# Patient Record
Sex: FEMALE | Race: WHITE | NOT HISPANIC OR LATINO | Employment: STUDENT | URBAN - METROPOLITAN AREA
[De-identification: names, ages, dates, MRNs, and addresses within clinical notes are randomized per-mention and may not be internally consistent; named-entity substitution may affect disease eponyms.]

---
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
## 2021-03-08 ENCOUNTER — TELEPHONE (OUTPATIENT)
Dept: INTERNAL MEDICINE CLINIC | Facility: CLINIC | Age: 21
End: 2021-03-08

## 2021-03-08 NOTE — TELEPHONE ENCOUNTER
The patient's mother saw her daughter's labs on mychart  She is concerned about some of the results and would like to speak to you directly  Please advise   Thank you

## 2021-03-09 DIAGNOSIS — E03.8 SUBCLINICAL HYPOTHYROIDISM: Primary | ICD-10-CM

## 2021-03-18 ENCOUNTER — TELEMEDICINE (OUTPATIENT)
Dept: INTERNAL MEDICINE CLINIC | Facility: CLINIC | Age: 21
End: 2021-03-18
Payer: COMMERCIAL

## 2021-03-18 DIAGNOSIS — R11.2 NON-INTRACTABLE VOMITING WITH NAUSEA, UNSPECIFIED VOMITING TYPE: Primary | ICD-10-CM

## 2021-03-18 PROBLEM — R11.10 NON-INTRACTABLE VOMITING: Status: ACTIVE | Noted: 2021-03-18

## 2021-03-18 PROCEDURE — G2012 BRIEF CHECK IN BY MD/QHP: HCPCS | Performed by: NURSE PRACTITIONER

## 2021-03-18 RX ORDER — ONDANSETRON 4 MG/1
4 TABLET, ORALLY DISINTEGRATING ORAL EVERY 8 HOURS PRN
Qty: 20 TABLET | Refills: 0 | Status: SHIPPED | OUTPATIENT
Start: 2021-03-18

## 2021-03-18 NOTE — PROGRESS NOTES
Virtual Brief Visit    Assessment/Plan:    Problem List Items Addressed This Visit        Digestive    Non-intractable vomiting - Primary       Patient's symptoms appear to be viral gastroenteritis  Recommend lots of fluids water chicken soup and rest   I offered COVID testing but patient declined  Ordered Zofran 4 mg take every 8 hours for nausea and vomiting and instructed patient to call us back if worsening  In regards to insomnia I could not prescribe her Seroquel and she was instructed to call her psychiatrist   Both patient and mother agreed         Relevant Medications    ondansetron (ZOFRAN-ODT) 4 mg disintegrating tablet                Reason for visit is   Chief Complaint   Patient presents with    Virtual Brief Visit        Encounter provider MARIIA Malagon    Provider located at [address] [place] [id number]    Recent Visits  No visits were found meeting these conditions  Showing recent visits within past 7 days and meeting all other requirements     Today's Visits  Date Type Provider Dept   03/18/21 Telemedicine Lindy Malagon today's visits and meeting all other requirements     Future Appointments  No visits were found meeting these conditions  Showing future appointments within next 150 days and meeting all other requirements        After connecting through telephone, the patient was identified by name and date of birth  Jessica Naresh was informed that this is a telemedicine visit and that the visit is being conducted through telephone  My office door was closed  No one else was in the room  She acknowledged consent and understanding of privacy and security of the platform  The patient has agreed to participate and understands she can discontinue the visit at any time  Patient is aware this is a billable service  Meli Arboleda is a 24 y o  female       Patient is here with complaints of nausea and vomiting for 3 days  T-max of 99F  She also has severe chills   denies any diarrhea or abdominal pain   she has absolutely no respiratory symptoms including cough, headache, runny nose, sore throat, no loss of smell or taste  She has trouble sleeping because she is out of her Seroquel and due to the nausea she is unable to sleep  She does have a psychiatrist        Past Medical History:   Diagnosis Date    Hodgkin lymphoma ([facility] [address] )        Past Surgical History:   Procedure Laterality Date    BONE MARROW BIOPSY         Current Outpatient Medications   Medication Sig Dispense Refill    amphetamine-dextroamphetamine (ADDERALL) 15 MG tablet Take by mouth daily      busPIRone (BUSPAR) 15 mg tablet Take 15 mg by mouth 2 (two) times a day      Cholecalciferol (Vitamin D3) 25 MCG (1000 UT) CAPS Take by mouth      cloNIDine (CATAPRES) 0 2 mg tablet Take 1 tablet (0 2 mg total) by mouth daily at bedtime      EX-LAX 15 MG CHEW 1 capsule daily  0    fluticasone (FLONASE) 50 mcg/act nasal spray 1 spray into each nostril 2 (two) times a day 16 g 3    hydrOXYzine pamoate (VISTARIL) 25 mg capsule Take 50 mg by mouth daily at bedtime      Lactobacillus Rhamnosus, GG, (CULTURELLE PO) Take 1 tablet by mouth daily      lactulose 20 g/30 mL Take 10 g by mouth daily      magnesium citrate (Citroma) 1 745 g/30 mL oral solution Take by mouth      ondansetron (ZOFRAN-ODT) 4 mg disintegrating tablet Take 1 tablet (4 mg total) by mouth every 8 (eight) hours as needed for nausea or vomiting 20 tablet 0    risperiDONE (RisperDAL) 1 mg tablet Take 1 tablet by mouth daily      SODIUM FLUORIDE, DENTAL GEL, (PREVIDENT) 1 1 % GEL Take 1 application by mouth daily      Venlafaxine HCl (EFFEXOR PO) Take 225 mg by mouth daily        No current facility-administered medications for this visit           Allergies   Allergen Reactions    Other Allergic Rhinitis     Seasonal allergies       Review of Systems   Constitutional: Negative  HENT: Negative  Eyes: Negative  Respiratory: Negative  Cardiovascular: Negative  Gastrointestinal: Positive for nausea and vomiting  Musculoskeletal: Negative  Neurological: Negative  There were no vitals filed for this visit  I spent 15 minutes directly with the patient during this visit    VIRTUAL VISIT DISCLAIMER    Ervin Leif acknowledges that she has consented to an online visit or consultation  She understands that the online visit is based solely on information provided by her, and that, in the absence of a face-to-face physical evaluation by the physician, the diagnosis she receives is both limited and provisional in terms of accuracy and completeness  This is not intended to replace a full medical face-to-face evaluation by the physician  Ervin Yadav understands and accepts these terms

## 2021-03-18 NOTE — ASSESSMENT & PLAN NOTE
Patient's symptoms appear to be viral gastroenteritis  Recommend lots of fluids water chicken soup and rest   I offered COVID testing but patient declined  Ordered Zofran 4 mg take every 8 hours for nausea and vomiting and instructed patient to call us back if worsening    In regards to insomnia I could not prescribe her Seroquel and she was instructed to call her psychiatrist   Both patient and mother agreed

## 2021-06-02 ENCOUNTER — OFFICE VISIT (OUTPATIENT)
Dept: INTERNAL MEDICINE CLINIC | Facility: CLINIC | Age: 21
End: 2021-06-02
Payer: COMMERCIAL

## 2021-06-02 VITALS
HEIGHT: 59 IN | OXYGEN SATURATION: 100 % | WEIGHT: 113 LBS | HEART RATE: 125 BPM | DIASTOLIC BLOOD PRESSURE: 80 MMHG | SYSTOLIC BLOOD PRESSURE: 110 MMHG | TEMPERATURE: 98.4 F | BODY MASS INDEX: 22.78 KG/M2

## 2021-06-02 DIAGNOSIS — R06.02 SHORTNESS OF BREATH: ICD-10-CM

## 2021-06-02 DIAGNOSIS — K59.09 CHRONIC CONSTIPATION: ICD-10-CM

## 2021-06-02 DIAGNOSIS — E55.9 VITAMIN D INSUFFICIENCY: ICD-10-CM

## 2021-06-02 DIAGNOSIS — R63.4 WEIGHT LOSS: ICD-10-CM

## 2021-06-02 DIAGNOSIS — R53.82 CHRONIC FATIGUE: Primary | ICD-10-CM

## 2021-06-02 PROCEDURE — 1036F TOBACCO NON-USER: CPT | Performed by: INTERNAL MEDICINE

## 2021-06-02 PROCEDURE — 3008F BODY MASS INDEX DOCD: CPT | Performed by: INTERNAL MEDICINE

## 2021-06-02 PROCEDURE — 99214 OFFICE O/P EST MOD 30 MIN: CPT | Performed by: INTERNAL MEDICINE

## 2021-06-02 RX ORDER — LACTULOSE 20 G/30ML
10 SOLUTION ORAL DAILY
Qty: 946 ML | Refills: 1 | Status: SHIPPED | OUTPATIENT
Start: 2021-06-02

## 2021-06-02 NOTE — PROGRESS NOTES
Assessment/Plan:  Chronic fatigue and recent SOB, weight loss  Obtain labs ASAP and CXR  Resume daily lactulose for chronic constipation     Problem List Items Addressed This Visit        Digestive    Chronic constipation    Relevant Medications    lactulose 20 g/30 mL    Other Relevant Orders    TSH, 3rd generation with Free T4 reflex       Other    Fatigue - Primary    Relevant Orders    TSH, 3rd generation with Free T4 reflex    CBC and differential    Comprehensive metabolic panel    Vitamin D insufficiency    Relevant Orders    Vitamin D 25 hydroxy      Other Visit Diagnoses     Weight loss        Relevant Orders    TSH, 3rd generation with Free T4 reflex    CBC and differential    Comprehensive metabolic panel    Shortness of breath        Relevant Orders    XR chest pa & lateral            Subjective:      Patient ID: Sapna Ruiz is a 22 y o  female  HPI  Here with her mother  C/o fatigue  SOB with exertion for the past few months  Since she started new OCP a few months ago;she gets a few days a week  Chronic constipation for which she was going to [facility]  Currently using enemas PRN  She was on lactulose, Ex-lax and Culturelle daily  She ran out of lactulose a few months ago  Not seeing GI locally and and last time Miralax was recommended  Poor appetite and weight loss  She has Hodgkins lymphoma in remission    The following portions of the patient's history were reviewed and updated as appropriate: allergies, current medications, past family history, past medical history, past social history, past surgical history and problem list     Review of Systems   Constitutional: Positive for appetite change (less), fatigue and unexpected weight change (weight loss)  Negative for chills and fever  HENT: Negative for congestion  Respiratory: Positive for chest tightness and shortness of breath (with exertion)  Negative for cough and wheezing  Gastrointestinal: Positive for nausea and vomiting  Objective:      /80   Pulse (!) 125   Temp 98 4 °F (36 9 °C) (Temporal)   Ht 4' 11" (1 499 m)   Wt 51 3 kg (113 lb)   SpO2 100%   BMI 22 82 kg/m²          Physical Exam  Constitutional:       General: She is not in acute distress  Appearance: She is well-developed  She is not ill-appearing, toxic-appearing or diaphoretic  HENT:      Head: Normocephalic and atraumatic  Eyes:      Conjunctiva/sclera: Conjunctivae normal    Neck:      Musculoskeletal: Neck supple  Cardiovascular:      Rate and Rhythm: Normal rate and regular rhythm  Heart sounds: Normal heart sounds  No murmur  Pulmonary:      Effort: Pulmonary effort is normal  No respiratory distress  Breath sounds: Normal breath sounds  No wheezing or rales  Abdominal:      General: There is no distension  Palpations: Abdomen is soft  There is no mass  Tenderness: There is no abdominal tenderness  There is no guarding or rebound  Musculoskeletal:      Right lower leg: No edema  Left lower leg: No edema  Skin:     General: Skin is warm and dry  Neurological:      Mental Status: She is alert and oriented to person, place, and time  Psychiatric:         Mood and Affect: Mood is anxious  Behavior: Behavior normal          Thought Content:  Thought content normal          Judgment: Judgment normal

## 2021-06-21 ENCOUNTER — APPOINTMENT (OUTPATIENT)
Dept: RADIOLOGY | Age: 21
End: 2021-06-21
Payer: COMMERCIAL

## 2021-06-21 DIAGNOSIS — R06.02 SHORTNESS OF BREATH: ICD-10-CM

## 2021-06-21 PROCEDURE — 71046 X-RAY EXAM CHEST 2 VIEWS: CPT

## 2021-06-23 ENCOUNTER — OFFICE VISIT (OUTPATIENT)
Dept: INTERNAL MEDICINE CLINIC | Facility: CLINIC | Age: 21
End: 2021-06-23
Payer: COMMERCIAL

## 2021-06-23 VITALS
SYSTOLIC BLOOD PRESSURE: 98 MMHG | HEIGHT: 59 IN | WEIGHT: 114.2 LBS | DIASTOLIC BLOOD PRESSURE: 62 MMHG | HEART RATE: 94 BPM | OXYGEN SATURATION: 100 % | BODY MASS INDEX: 23.02 KG/M2 | TEMPERATURE: 97.7 F

## 2021-06-23 DIAGNOSIS — Z11.59 NEED FOR HEPATITIS C SCREENING TEST: ICD-10-CM

## 2021-06-23 DIAGNOSIS — E55.9 VITAMIN D INSUFFICIENCY: Primary | ICD-10-CM

## 2021-06-23 DIAGNOSIS — E03.8 SUBCLINICAL HYPOTHYROIDISM: ICD-10-CM

## 2021-06-23 DIAGNOSIS — Z11.4 SCREENING FOR HIV (HUMAN IMMUNODEFICIENCY VIRUS): ICD-10-CM

## 2021-06-23 PROCEDURE — 3008F BODY MASS INDEX DOCD: CPT | Performed by: INTERNAL MEDICINE

## 2021-06-23 PROCEDURE — 99214 OFFICE O/P EST MOD 30 MIN: CPT | Performed by: INTERNAL MEDICINE

## 2021-06-23 PROCEDURE — 1036F TOBACCO NON-USER: CPT | Performed by: INTERNAL MEDICINE

## 2021-06-23 RX ORDER — CHOLECALCIFEROL (VITAMIN D3) 125 MCG
2000 CAPSULE ORAL DAILY
Qty: 90 TABLET | Refills: 1 | Status: SHIPPED | OUTPATIENT
Start: 2021-06-23

## 2021-06-23 NOTE — PROGRESS NOTES
Assessment/Plan:  Fatigue and SOB have improved spontaneously  D is a little low so start taking 2000 IU daily  Will monitor TSH    Advised to start using Debrox to the right ear     Problem List Items Addressed This Visit        Other    Vitamin D insufficiency - Primary    Relevant Medications    Cholecalciferol (Vitamin D3) 50 MCG (2000 UT) TABS    Other Relevant Orders    Vitamin D 25 hydroxy      Other Visit Diagnoses     Need for hepatitis C screening test        Relevant Orders    Hepatitis C Antibody ([facility])    Screening for HIV (human immunodeficiency virus)        Relevant Orders    HIV 1/2 Antigen/Antibody (4th Generation) w Reflex [facility]    Subclinical hypothyroidism        Relevant Orders    TSH, 3rd generation with Free T4 reflex            Subjective:      Patient ID: Onesimo Lombardi is a 22 y o  female  HPI  She was seen 3 weeks ago with fatigue weight loss and SOB  Hodgkins lymphoma in remission  CXR was normal  TSH slightly elevated with normal T4; vitamin D is a little low  She has been feeling better in the past 3 weeks  Sometimes gets SOB for example when she was walking outside carrying something but not when she was exercising for an hour in the gym  She does not drink enough water    The following portions of the patient's history were reviewed and updated as appropriate: allergies, current medications, past medical history, past social history, past surgical history and problem list     Review of Systems   Constitutional: Positive for fatigue  Negative for chills and fever  Respiratory: Positive for shortness of breath  Negative for cough  Cardiovascular: Negative for chest pain and palpitations  Gastrointestinal: Positive for constipation (just resumed Lactulose and is on Exlax and Culturelle)  Negative for abdominal pain           Objective:      BP 98/62   Pulse 94   Temp 97 7 °F (36 5 °C)   Ht 4' 11" (1 499 m)   Wt 51 8 kg (114 lb 3 2 oz)   SpO2 100%   BMI 23 07 kg/m²          Physical Exam  Constitutional:       General: She is not in acute distress  Appearance: She is well-developed  She is not ill-appearing, toxic-appearing or diaphoretic  HENT:      Head: Normocephalic and atraumatic  Right Ear: There is impacted cerumen  Left Ear: There is no impacted cerumen  Eyes:      Conjunctiva/sclera: Conjunctivae normal    Cardiovascular:      Rate and Rhythm: Normal rate and regular rhythm  Heart sounds: Normal heart sounds  No murmur heard  Pulmonary:      Effort: Pulmonary effort is normal  No respiratory distress  Breath sounds: Normal breath sounds  No wheezing or rales  Abdominal:      General: There is no distension  Palpations: Abdomen is soft  There is no mass  Tenderness: There is no abdominal tenderness  There is no guarding or rebound  Musculoskeletal:      Cervical back: Neck supple  Skin:     General: Skin is warm and dry  Neurological:      Mental Status: She is alert and oriented to person, place, and time  Psychiatric:         Mood and Affect: Mood normal          Behavior: Behavior normal          Thought Content:  Thought content normal          Judgment: Judgment normal

## 2021-10-08 ENCOUNTER — OFFICE VISIT (OUTPATIENT)
Dept: INTERNAL MEDICINE CLINIC | Facility: CLINIC | Age: 21
End: 2021-10-08
Payer: COMMERCIAL

## 2021-10-08 VITALS
SYSTOLIC BLOOD PRESSURE: 88 MMHG | WEIGHT: 111 LBS | HEIGHT: 59 IN | HEART RATE: 111 BPM | DIASTOLIC BLOOD PRESSURE: 52 MMHG | TEMPERATURE: 96.4 F | BODY MASS INDEX: 22.38 KG/M2 | OXYGEN SATURATION: 100 %

## 2021-10-08 DIAGNOSIS — Z23 ENCOUNTER FOR IMMUNIZATION: ICD-10-CM

## 2021-10-08 DIAGNOSIS — N30.00 ACUTE CYSTITIS WITHOUT HEMATURIA: Primary | ICD-10-CM

## 2021-10-08 LAB
BACTERIA UR QL AUTO: ABNORMAL /HPF
BILIRUB UR QL STRIP: NEGATIVE
CLARITY UR: ABNORMAL
COARSE GRAN CASTS URNS QL MICRO: ABNORMAL /LPF
COLOR UR: YELLOW
GLUCOSE UR STRIP-MCNC: NEGATIVE MG/DL
HGB UR QL STRIP.AUTO: NEGATIVE
KETONES UR STRIP-MCNC: NEGATIVE MG/DL
LEUKOCYTE ESTERASE UR QL STRIP: ABNORMAL
NITRITE UR QL STRIP: NEGATIVE
NON-SQ EPI CELLS URNS QL MICRO: ABNORMAL /HPF
PH UR STRIP.AUTO: 6 [PH]
PROT UR STRIP-MCNC: NEGATIVE MG/DL
RBC #/AREA URNS AUTO: ABNORMAL /HPF
SL AMB  POCT GLUCOSE, UA: ABNORMAL
SL AMB LEUKOCYTE ESTERASE,UA: ABNORMAL
SL AMB POCT BILIRUBIN,UA: ABNORMAL
SL AMB POCT BLOOD,UA: ABNORMAL
SL AMB POCT CLARITY,UA: CLEAR
SL AMB POCT COLOR,UA: ABNORMAL
SL AMB POCT KETONES,UA: ABNORMAL
SL AMB POCT NITRITE,UA: ABNORMAL
SL AMB POCT PH,UA: 6
SL AMB POCT SPECIFIC GRAVITY,UA: 1.03
SL AMB POCT URINE PROTEIN: ABNORMAL
SL AMB POCT UROBILINOGEN: 3.5
SP GR UR STRIP.AUTO: 1.02 (ref 1–1.03)
UROBILINOGEN UR QL STRIP.AUTO: 0.2 E.U./DL
WBC #/AREA URNS AUTO: ABNORMAL /HPF

## 2021-10-08 PROCEDURE — 81002 URINALYSIS NONAUTO W/O SCOPE: CPT | Performed by: NURSE PRACTITIONER

## 2021-10-08 PROCEDURE — 87086 URINE CULTURE/COLONY COUNT: CPT | Performed by: NURSE PRACTITIONER

## 2021-10-08 PROCEDURE — 81001 URINALYSIS AUTO W/SCOPE: CPT | Performed by: NURSE PRACTITIONER

## 2021-10-08 PROCEDURE — 99213 OFFICE O/P EST LOW 20 MIN: CPT | Performed by: NURSE PRACTITIONER

## 2021-10-08 RX ORDER — CIPROFLOXACIN 500 MG/1
500 TABLET, FILM COATED ORAL EVERY 12 HOURS SCHEDULED
Qty: 14 TABLET | Refills: 0 | Status: SHIPPED | OUTPATIENT
Start: 2021-10-08 | End: 2021-10-15

## 2021-10-08 RX ORDER — MEDROXYPROGESTERONE ACETATE 150 MG/ML
INJECTION, SUSPENSION INTRAMUSCULAR
COMMUNITY
Start: 2021-08-13 | End: 2022-02-01 | Stop reason: SDUPTHER

## 2021-10-09 LAB — BACTERIA UR CULT: NORMAL

## 2021-12-16 ENCOUNTER — VBI (OUTPATIENT)
Dept: ADMINISTRATIVE | Facility: OTHER | Age: 21
End: 2021-12-16

## 2022-02-01 ENCOUNTER — OFFICE VISIT (OUTPATIENT)
Dept: INTERNAL MEDICINE CLINIC | Facility: CLINIC | Age: 22
End: 2022-02-01
Payer: COMMERCIAL

## 2022-02-01 VITALS
OXYGEN SATURATION: 99 % | DIASTOLIC BLOOD PRESSURE: 70 MMHG | RESPIRATION RATE: 14 BRPM | HEART RATE: 126 BPM | SYSTOLIC BLOOD PRESSURE: 94 MMHG | WEIGHT: 110.8 LBS | TEMPERATURE: 98.7 F | BODY MASS INDEX: 22.34 KG/M2 | HEIGHT: 59 IN

## 2022-02-01 DIAGNOSIS — C81.19: ICD-10-CM

## 2022-02-01 DIAGNOSIS — E55.9 VITAMIN D INSUFFICIENCY: ICD-10-CM

## 2022-02-01 DIAGNOSIS — E03.8 SUBCLINICAL HYPOTHYROIDISM: ICD-10-CM

## 2022-02-01 DIAGNOSIS — Z00.00 ANNUAL PHYSICAL EXAM: Primary | ICD-10-CM

## 2022-02-01 DIAGNOSIS — R42 DIZZINESS: ICD-10-CM

## 2022-02-01 PROBLEM — R53.83 FATIGUE: Status: RESOLVED | Noted: 2017-04-25 | Resolved: 2022-02-01

## 2022-02-01 PROBLEM — N30.00 ACUTE CYSTITIS WITHOUT HEMATURIA: Status: RESOLVED | Noted: 2021-10-08 | Resolved: 2022-02-01

## 2022-02-01 PROCEDURE — 99395 PREV VISIT EST AGE 18-39: CPT | Performed by: INTERNAL MEDICINE

## 2022-02-01 RX ORDER — LINACLOTIDE 290 UG/1
1 CAPSULE, GELATIN COATED ORAL EVERY MORNING
COMMUNITY
Start: 2022-01-06

## 2022-02-01 NOTE — PATIENT INSTRUCTIONS

## 2022-02-01 NOTE — PROGRESS NOTES
[facility]    NAME: Peg Winchester  AGE: 22 y o  SEX: female  : 2000     DATE: 2022     Assessment and Plan:     Problem List Items Addressed This Visit        Other    Vitamin D insufficiency    Relevant Orders    Vitamin D 25 hydroxy    Hodgkin's disease, nodular sclerosis, extranodal and solid organ sites [facility])     In remission           Other Visit Diagnoses     Annual physical exam    -  Primary    Dizziness        Relevant Orders    CBC and differential    Comprehensive metabolic panel    Subclinical hypothyroidism        Relevant Orders    TSH, 3rd generation with Free T4 reflex          Immunizations and preventive care screenings were discussed with patient today  Appropriate education was printed on patient's after visit summary  Counseling:  · Exercise: the importance of regular exercise/physical activity was discussed  Recommend exercise 3-5 times per week for at least 30 minutes  Call GI re: Naty Church which is causing diarrhea   Stop it for now but discuss going on a lower dose  Discuss clonidine with psychiatry because of orthostasis       Return in about 4 months (around 2022)  Chief Complaint:     Chief Complaint   Patient presents with    Physical Exam      History of Present Illness:     Adult Annual Physical   Patient here for a comprehensive physical exam  The patient reports problems - diarrhea  Chronic constipation and started Linzess 290mcg about a week ago  She was taking Exlax, Lactulose and Culturelle daily  She is still taking these    Dizzy spells, lightheaded when she gets up from bed for months now  Shaking when this happens but no LOC    Diet and Physical Activity  · Diet/Nutrition: limited junk food and consuming 3-5 servings of fruits/vegetables daily  · Exercise: 1-2 times a week on average        Depression Screening  PHQ-2/9 Depression Screening         General Health  · Sleep: difficult to stay asleep  · Hearing: normal - bilateral   · Vision: no vision problems  · Dental: regular dental visits  /GYN Health  · On depo injections x 1 year     Review of Systems:     Review of Systems   Constitutional: Negative for chills, fever and unexpected weight change  Respiratory: Negative for shortness of breath  Cardiovascular: Negative for chest pain and palpitations  Gastrointestinal: Positive for diarrhea  Negative for abdominal pain, nausea and vomiting  Neurological: Positive for dizziness  Psychiatric/Behavioral: Positive for sleep disturbance        Past Medical History:     Past Medical History:   Diagnosis Date    Acute cystitis without hematuria 10/8/2021    Fatigue 4/25/2017    Hodgkin lymphoma [facility])       Past Surgical History:     Past Surgical History:   Procedure Laterality Date    BONE MARROW BIOPSY        Social History:     Social History     Socioeconomic History    Marital status: Single     Spouse name: None    Number of children: None    Years of education: None    Highest education level: None   Occupational History    None   Tobacco Use    Smoking status: Never Smoker    Smokeless tobacco: Never Used   Vaping Use    Vaping Use: Never used   Substance and Sexual Activity    Alcohol use: Never    Drug use: Never    Sexual activity: Never   Other Topics Concern    None   Social History Narrative    None     Social Determinants of Health     Financial Resource Strain: Not on file   Food Insecurity: Not on file   Transportation Needs: Not on file   Physical Activity: Not on file   Stress: Not on file   Social Connections: Not on file   Intimate Partner Violence: Not on file   Housing Stability: Not on file      Family History:     Family History   Problem Relation Age of Onset    No Known Problems Mother     Depression Father     Bipolar disorder Father     Diabetes Maternal Grandmother     Lymphoma Maternal Grandmother  Hypertension Maternal Grandfather     Colon cancer Paternal Grandmother       Current Medications:     Current Outpatient Medications   Medication Sig Dispense Refill    amphetamine-dextroamphetamine (ADDERALL) 15 MG tablet Take by mouth daily 25mg      busPIRone (BUSPAR) 15 mg tablet Take 15 mg by mouth 2 (two) times a day      cloNIDine (CATAPRES) 0 2 mg tablet Take 1 tablet (0 2 mg total) by mouth daily at bedtime      EX-LAX 15 MG CHEW 1 capsule daily  0    hydrOXYzine pamoate (VISTARIL) 25 mg capsule Take 50 mg by mouth daily at bedtime      Lactobacillus Rhamnosus, GG, (Culturelle) CAPS Take 1 capsule by mouth daily 90 capsule 3    lactulose 20 g/30 mL Take 15 mL (10 g total) by mouth daily 946 mL 1    Linzess 290 MCG CAPS Take 1 capsule by mouth every morning      magnesium citrate (Citroma) 1 745 g/30 mL oral solution Take by mouth as needed       medroxyPROGESTERone (DEPO-PROVERA) 150 mg/mL injection use as directed at physician's office every 3 months      QUEtiapine (SEROquel) 200 mg tablet Take 200 mg by mouth daily at bedtime      Venlafaxine HCl (EFFEXOR PO) Take 225 mg by mouth daily       Cholecalciferol (Vitamin D3) 50 MCG (2000 UT) TABS Take 1 tablet (2,000 Units total) by mouth daily (Patient not taking: Reported on 2/1/2022 ) 90 tablet 1    fluticasone (FLONASE) 50 mcg/act nasal spray 1 spray into each nostril 2 (two) times a day (Patient not taking: Reported on 2/1/2022 ) 16 g 3    ondansetron (ZOFRAN-ODT) 4 mg disintegrating tablet Take 1 tablet (4 mg total) by mouth every 8 (eight) hours as needed for nausea or vomiting (Patient not taking: Reported on 2/1/2022 ) 20 tablet 0     No current facility-administered medications for this visit  Allergies:      Allergies   Allergen Reactions    Other Allergic Rhinitis     Seasonal allergies      Physical Exam:     BP 94/70 (Patient Position: Sitting)   Pulse (!) 126   Temp 98 7 °F (37 1 °C)   Resp 14   Ht 4' 11" (1 499 m) Wt 50 3 kg (110 lb 12 8 oz)   SpO2 99%   BMI 22 38 kg/m²     Physical Exam  Constitutional:       General: She is not in acute distress  Appearance: She is well-developed  She is not ill-appearing, toxic-appearing or diaphoretic  HENT:      Head: Normocephalic and atraumatic  Right Ear: External ear normal  There is impacted cerumen  Left Ear: External ear normal  There is no impacted cerumen  Eyes:      Conjunctiva/sclera: Conjunctivae normal    Cardiovascular:      Rate and Rhythm: Normal rate and regular rhythm  Heart sounds: Normal heart sounds  No murmur heard  Pulmonary:      Effort: Pulmonary effort is normal  No respiratory distress  Breath sounds: Normal breath sounds  No stridor  No wheezing or rales  Abdominal:      General: There is no distension  Palpations: Abdomen is soft  There is no mass  Tenderness: There is no abdominal tenderness  There is no guarding or rebound  Musculoskeletal:      Cervical back: Neck supple  Skin:     General: Skin is warm and dry  Neurological:      Mental Status: She is alert and oriented to person, place, and time  Psychiatric:         Mood and Affect: Mood normal          Behavior: Behavior normal          Thought Content:  Thought content normal          Judgment: Judgment normal           Chirag Dugan MD   [facility]

## 2022-03-15 ENCOUNTER — TELEPHONE (OUTPATIENT)
Dept: INTERNAL MEDICINE CLINIC | Facility: CLINIC | Age: 22
End: 2022-03-15

## 2022-03-15 NOTE — TELEPHONE ENCOUNTER
Patients mother called in because we try to reach patient to schedule a sooner appointment than next week per carolynn message and her symptoms she is having  Her mother would not be able to get here until about 4:45pm  I offered and appointment for today with MARIIA later today but she stated she wouldn't be able to get her on time       Please advise

## 2022-03-15 NOTE — TELEPHONE ENCOUNTER
I think this is ok for next week since it has been on and off for >1 week already  Advise them to take her to the ER if it gets worse

## 2022-03-16 ENCOUNTER — OFFICE VISIT (OUTPATIENT)
Dept: URGENT CARE | Age: 22
End: 2022-03-16
Payer: COMMERCIAL

## 2022-03-16 ENCOUNTER — APPOINTMENT (OUTPATIENT)
Dept: RADIOLOGY | Age: 22
End: 2022-03-16
Payer: COMMERCIAL

## 2022-03-16 VITALS
TEMPERATURE: 100.5 F | BODY MASS INDEX: 22.58 KG/M2 | HEIGHT: 59 IN | OXYGEN SATURATION: 100 % | WEIGHT: 112 LBS | HEART RATE: 122 BPM | RESPIRATION RATE: 18 BRPM

## 2022-03-16 DIAGNOSIS — R07.89 CHEST HEAVINESS: ICD-10-CM

## 2022-03-16 DIAGNOSIS — R07.89 CHEST HEAVINESS: Primary | ICD-10-CM

## 2022-03-16 PROCEDURE — 93005 ELECTROCARDIOGRAM TRACING: CPT

## 2022-03-16 PROCEDURE — 71046 X-RAY EXAM CHEST 2 VIEWS: CPT

## 2022-03-16 PROCEDURE — 99213 OFFICE O/P EST LOW 20 MIN: CPT

## 2022-03-16 NOTE — PATIENT INSTRUCTIONS
Chest Pain     EKG revealed sinus tachycardia  Resting HR 120s-130s  Temp 100 5  CXR prelim reading: negative for any acute cardiopulmonary disease  As we are limited with testing/resources in the urgent care setting, would recommend to proceed to ER as also recommended by your PCP yesterday  Hope you feel better  AMBULATORY CARE:   Chest pain can be caused by a range of conditions, from not serious to life-threatening  It may be caused by a heart attack or a blood clot in your lungs  Sometimes chest pain or pressure is caused by poor blood flow to your heart (angina)  Infection, inflammation, or a fracture in the bones or cartilage in your chest can cause pain or discomfort  Chest pain can also be a symptom of a digestive problem, such as acid reflux or a stomach ulcer  An anxiety attack or a strong emotion such as anger can also cause chest pain  It is important to follow up with your healthcare provider to find the cause of your chest pain  Common symptoms you may have with chest pain:     · Fever or sweating    · Nausea or vomiting    · Shortness of breath    · Discomfort or pressure that spreads from your chest to your back, jaw, or arm    · A racing or slow heartbeat    · Feeling weak, tired, or faint    Call your local emergency number (911 in the [address] [place] [address],[address]) or have someone call if:     · You have any of the following signs of a heart attack:      ? Squeezing, pressure, or pain in your chest    ? You may also have any of the following:     § Discomfort or pain in your back, neck, jaw, stomach, or arm    § Shortness of breath    § Nausea or vomiting    § Lightheadedness or a sudden cold sweat      Seek care immediately if:     · You have chest discomfort that gets worse, even with medicine  · You cough or vomit blood  · Your bowel movements are black or bloody  · You cannot stop vomiting, or it hurts to swallow      Call your doctor if:   · You have questions or concerns about your condition or care       Treatment for chest pain  may include medicine to treat your symptoms while your healthcare provider finds the cause of your chest pain  · Medicines  may be given to treat the cause of your chest pain  Examples include pain medicine, anxiety medicine, or medicines to increase blood flow to your heart  · Do not take certain medicines without asking your healthcare provider first   These include NSAIDs, herbal or vitamin supplements, or hormones (estrogen or progestin)  · One or more stents  may need to be placed in your heart if pain was caused by blockage  A stent is a wire mesh tube that helps hold your artery open  Healthy living tips: The following are general healthy guidelines  If the cause of your chest pain is known, your healthcare provider will give you specific guidelines to follow  · Do not smoke  Nicotine and other chemicals in cigarettes and cigars can cause lung and heart damage  Ask your healthcare provider for information if you currently smoke and need help to quit  E-cigarettes or smokeless tobacco still contain nicotine  Talk to your healthcare provider before you use these products  · Choose a variety of healthy foods as often as possible  Include fresh, frozen, or canned fruits and vegetables  Also include low-fat dairy products, fish, chicken (without skin), and lean meats  Your healthcare provider or a dietitian can help you create meal plans  You may need to avoid certain foods or drinks if your pain is caused by a digestion problem  · Lower your sodium (salt) intake  Limit foods that are high in sodium, such as canned foods, salty snacks, and cold cuts  If you add salt when you cook food, do not add more at the table  Choose low-sodium canned foods as much as possible  · Drink plenty of water every day  Water helps your body to control your temperature and blood pressure   Ask your healthcare provider how much water you should drink every day     · Ask about activity  Your healthcare provider will tell you which activities to limit or avoid  Ask when you can drive, return to work, and have sex  Ask about the best exercise plan for you  · Maintain a healthy weight  Ask your healthcare provider what a healthy weight is for you  Ask him or her to help you create a safe weight loss plan if you are overweight  · Ask about vaccines you may need  Get the influenza (flu) vaccine every year as soon as recommended, usually in September or October  You may also need a pneumococcal vaccine to prevent pneumonia  The vaccine is usually given every 5 years, starting at age 72  Your healthcare provider can tell you if should get other vaccines, and when to get them  Follow up with your healthcare provider within 72 hours, or as directed: You may need to return for more tests to find the cause of your chest pain  You may be referred to a specialist, such as a cardiologist or gastroenterologist  Write down your questions so you remember to ask them during your visits  © Copyright [facility] 2022 Information is for End User's use only and may not be sold, redistributed or otherwise used for commercial purposes  All illustrations and images included in CareNotes® are the copyrighted property of [employer]  or [facility] [employer]   The above information is an  only  It is not intended as medical advice for individual conditions or treatments  Talk to your doctor, nurse or pharmacist before following any medical regimen to see if it is safe and effective for you

## 2022-03-16 NOTE — PROGRESS NOTES
[facility]        NAME: Meron Win is a 22 y o  female  : 2000    MRN: [id number]  DATE: 2022  TIME: 3:33 PM    Assessment and Plan   Chest heaviness [R07 89]  1  Chest heaviness  XR chest pa & lateral         Patient Instructions     XR prelim reading: no cardiopulm disease  R curvature of the mid thoracic spine  Appears stable when compared to previous imaging  Resting HR 120s-130s  ST on EKG  T 100 5 tympanic  Recommend proceeding to ER for further evaluation as also recommended by PCP yesterday  Follow up with PCP in 3-5 days  Chief Complaint     Chief Complaint   Patient presents with    Shortness of Breath     chest heaviness like elephant on chest >2wks, denies CP  Slight dizziness w/ occurance         History of Present Illness     22 y o  F presents with complaints of chest heaviness, dizziness and SOB x 2 weeks  Denies true chest pain/discomfort but states it feels like an elephant is sitting on her chest  States it's worse with position change  No hx of MI, PE, DVT  Hx significant for Hodgkin's lymphoma, in remission  Hx ADHD, taking Adderall daily  Uses Vistaril as needed  Denies recent changes in medications  Denies anxiety attack  Denies syncope/LOC  States she is nervous because of her hx of lymphoma with chest mass  Requesting a CXR  Review of Systems   Review of Systems   Constitutional: Negative for chills, fatigue and fever  HENT: Negative for congestion, ear discharge, ear pain, facial swelling, rhinorrhea, sinus pain, sore throat and trouble swallowing  Eyes: Negative for photophobia and visual disturbance  Respiratory: Positive for chest tightness  Negative for cough, shortness of breath and wheezing  Cardiovascular: Negative for chest pain, palpitations and leg swelling  Gastrointestinal: Negative for abdominal pain, diarrhea, nausea and vomiting  Genitourinary: Negative for dysuria and flank pain     Musculoskeletal: Negative for arthralgias, back pain and myalgias  Skin: Negative for pallor  Neurological: Negative for dizziness, seizures, syncope, weakness, numbness and headaches  Psychiatric/Behavioral: Negative for sleep disturbance           Current Medications       Current Outpatient Medications:     amphetamine-dextroamphetamine (ADDERALL) 15 MG tablet, Take by mouth daily 25mg, Disp: , Rfl:     busPIRone (BUSPAR) 15 mg tablet, Take 15 mg by mouth 2 (two) times a day, Disp: , Rfl:     cloNIDine (CATAPRES) 0 2 mg tablet, Take 1 tablet (0 2 mg total) by mouth daily at bedtime, Disp: , Rfl:     EX-LAX 15 MG CHEW, 1 capsule daily, Disp: , Rfl: 0    hydrOXYzine pamoate (VISTARIL) 25 mg capsule, Take 50 mg by mouth daily at bedtime, Disp: , Rfl:     Lactobacillus Rhamnosus, GG, (Culturelle) CAPS, Take 1 capsule by mouth daily, Disp: 90 capsule, Rfl: 3    lactulose 20 g/30 mL, Take 15 mL (10 g total) by mouth daily, Disp: 946 mL, Rfl: 1    magnesium citrate (Citroma) 1 745 g/30 mL oral solution, Take by mouth as needed , Disp: , Rfl:     medroxyPROGESTERone (DEPO-PROVERA) 150 mg/mL injection, use as directed at physician's office every 3 months, Disp: , Rfl:     QUEtiapine (SEROquel) 200 mg tablet, Take 200 mg by mouth daily at bedtime, Disp: , Rfl:     Venlafaxine HCl (EFFEXOR PO), Take 225 mg by mouth daily , Disp: , Rfl:     Cholecalciferol (Vitamin D3) 50 MCG (2000 UT) TABS, Take 1 tablet (2,000 Units total) by mouth daily (Patient not taking: Reported on 2/1/2022 ), Disp: 90 tablet, Rfl: 1    fluticasone (FLONASE) 50 mcg/act nasal spray, 1 spray into each nostril 2 (two) times a day (Patient not taking: Reported on 2/1/2022 ), Disp: 16 g, Rfl: 3    Linzess 290 MCG CAPS, Take 1 capsule by mouth every morning, Disp: , Rfl:     ondansetron (ZOFRAN-ODT) 4 mg disintegrating tablet, Take 1 tablet (4 mg total) by mouth every 8 (eight) hours as needed for nausea or vomiting (Patient not taking: Reported on 2/1/2022 ), Disp: 20 tablet, Rfl: 0    Current Allergies     Allergies as of 03/16/2022 - Reviewed 03/16/2022   Allergen Reaction Noted    Other Allergic Rhinitis 08/12/2015            The following portions of the patient's history were reviewed and updated as appropriate: allergies, current medications, past family history, past medical history, past social history, past surgical history and problem list      Past Medical History:   Diagnosis Date    Acute cystitis without hematuria 10/8/2021    Fatigue 4/25/2017    Hodgkin lymphoma ([facility] [address] )        Past Surgical History:   Procedure Laterality Date    BONE MARROW BIOPSY         Family History   Problem Relation Age of Onset    No Known Problems Mother     Depression Father     Bipolar disorder Father     Diabetes Maternal Grandmother     Lymphoma Maternal Grandmother     Hypertension Maternal Grandfather     Colon cancer Paternal Grandmother          Medications have been verified  Objective   Pulse (!) 125   Temp 99 4 °F (37 4 °C)   Resp 18   Ht 4' 11" (1 499 m)   Wt 50 8 kg (112 lb)   LMP  (LMP Unknown)   SpO2 100%   BMI 22 62 kg/m²   No LMP recorded (lmp unknown)  Physical Exam     Physical Exam  Vitals reviewed  Constitutional:       General: She is not in acute distress  Appearance: Normal appearance  She is not toxic-appearing  HENT:      Head: Normocephalic  Right Ear: Tympanic membrane normal       Left Ear: Tympanic membrane normal       Nose: No congestion or rhinorrhea  Right Sinus: No maxillary sinus tenderness or frontal sinus tenderness  Left Sinus: No maxillary sinus tenderness or frontal sinus tenderness  Mouth/Throat:      Pharynx: No oropharyngeal exudate or posterior oropharyngeal erythema  Tonsils: No tonsillar exudate  Eyes:      General: No visual field deficit  Extraocular Movements: Extraocular movements intact  Pupils: Pupils are equal, round, and reactive to light     Neck: Vascular: No carotid bruit  Comments:   S1, S2; Sinus Tach on EKG  No murmurs; no carotid bruit  No pitting edema  Lungs CTAB, 100% RA  CP non-reproducible  Cardiovascular:      Rate and Rhythm: Normal rate and regular rhythm  Pulses: Normal pulses  Heart sounds: Normal heart sounds  Pulmonary:      Effort: Pulmonary effort is normal  No tachypnea or respiratory distress  Breath sounds: Normal breath sounds and air entry  No decreased breath sounds, wheezing, rhonchi or rales  Abdominal:      General: Bowel sounds are normal       Palpations: Abdomen is soft  Musculoskeletal:         General: Normal range of motion  Cervical back: Normal range of motion  Right lower leg: No edema  Left lower leg: No edema  Lymphadenopathy:      Cervical: No cervical adenopathy  Skin:     General: Skin is warm and dry  Neurological:      General: No focal deficit present  Mental Status: She is alert  GCS: GCS eye subscore is 4  GCS verbal subscore is 5  GCS motor subscore is 6  Cranial Nerves: Cranial nerves are intact  Sensory: Sensation is intact  Motor: Motor function is intact  Coordination: Coordination is intact  Romberg sign negative  Gait: Gait is intact  Deep Tendon Reflexes: Reflexes are normal and symmetric        Comments:   Neurovasc intact  PERRL, EOMI  CN II-XII grossly intact

## 2022-03-17 LAB
ATRIAL RATE: 118 BPM
ATRIAL RATE: 119 BPM
P AXIS: 84 DEGREES
P AXIS: 85 DEGREES
PR INTERVAL: 112 MS
PR INTERVAL: 130 MS
QRS AXIS: 78 DEGREES
QRS AXIS: 81 DEGREES
QRSD INTERVAL: 64 MS
QRSD INTERVAL: 68 MS
QT INTERVAL: 314 MS
QT INTERVAL: 322 MS
QTC INTERVAL: 440 MS
QTC INTERVAL: 452 MS
T WAVE AXIS: 73 DEGREES
T WAVE AXIS: 74 DEGREES
VENTRICULAR RATE: 118 BPM
VENTRICULAR RATE: 119 BPM

## 2022-03-17 PROCEDURE — 93010 ELECTROCARDIOGRAM REPORT: CPT | Performed by: INTERNAL MEDICINE

## 2022-03-22 ENCOUNTER — OFFICE VISIT (OUTPATIENT)
Dept: INTERNAL MEDICINE CLINIC | Facility: CLINIC | Age: 22
End: 2022-03-22
Payer: COMMERCIAL

## 2022-03-22 VITALS
HEIGHT: 59 IN | DIASTOLIC BLOOD PRESSURE: 72 MMHG | BODY MASS INDEX: 23.1 KG/M2 | RESPIRATION RATE: 16 BRPM | WEIGHT: 114.6 LBS | SYSTOLIC BLOOD PRESSURE: 104 MMHG | HEART RATE: 111 BPM | OXYGEN SATURATION: 99 % | TEMPERATURE: 98 F

## 2022-03-22 DIAGNOSIS — Z23 NEED FOR VACCINATION: ICD-10-CM

## 2022-03-22 DIAGNOSIS — R07.9 CHEST PAIN, UNSPECIFIED TYPE: Primary | ICD-10-CM

## 2022-03-22 PROCEDURE — 90682 RIV4 VACC RECOMBINANT DNA IM: CPT

## 2022-03-22 PROCEDURE — 99214 OFFICE O/P EST MOD 30 MIN: CPT | Performed by: INTERNAL MEDICINE

## 2022-03-22 PROCEDURE — 90471 IMMUNIZATION ADMIN: CPT

## 2022-03-22 NOTE — PROGRESS NOTES
Assessment/Plan:  Chest pain over the sternum with SOB that is not related to exertion,  No B symptoms,h/o lymphoma-normal exam, ekg, CXR  Discussed that this is possible musculo-skeletal, anxiety, less likely, cancer recurrence  We discussed monitoring for now and proceeding with a chest CT, possibly echo if it worsens or continues in the next 2 weeks  She is requesting the flu vaccine which was administered today       Problem List Items Addressed This Visit     None      Visit Diagnoses     Chest pain, unspecified type    -  Primary    Need for vaccination        Relevant Orders    influenza vaccine, quadrivalent, recombinant, PF, 0 5 mL, for patients 18 yr+ (FLUBLOK)            Subjective:      Patient ID: Onesimo Lombardi is a 22 y o  female  HPI  Here with her mother  3 weeks ago started with pain in the sternum when she gets up from bed  It has gotten worse that she now has it frequently wo exertion  It happens a few times a day and resolves on its own  She feels SOB when the pain is occurring  She went to the urgent care last week where EKG CXR are normal  No fever chills cough colds fatigue change in appetite or weight swollen glands nose bleeding, gum bleeding, easy bruising  H/o lymphoma    The following portions of the patient's history were reviewed and updated as appropriate: allergies, current medications, past family history, past medical history, past social history, past surgical history and problem list     Review of Systems   Constitutional: Negative for chills, fatigue, fever and unexpected weight change  HENT: Negative for rhinorrhea  Respiratory: Positive for shortness of breath  Negative for cough  Cardiovascular: Positive for chest pain  Negative for palpitations  Gastrointestinal: Positive for constipation (relieved by Sadie Crystal)  Negative for abdominal pain  Hematological: Negative for adenopathy  Does not bruise/bleed easily           Objective:      /72   Pulse (!) 111   Temp 98 °F (36 7 °C)   Resp 16   Ht 4' 11" (1 499 m)   Wt 52 kg (114 lb 9 6 oz)   LMP  (LMP Unknown)   SpO2 99%   BMI 23 15 kg/m²          Physical Exam  Constitutional:       General: She is not in acute distress  Appearance: She is well-developed  She is not ill-appearing, toxic-appearing or diaphoretic  HENT:      Head: Normocephalic and atraumatic  Eyes:      Conjunctiva/sclera: Conjunctivae normal    Cardiovascular:      Rate and Rhythm: Normal rate and regular rhythm  Heart sounds: Normal heart sounds  No murmur heard  Pulmonary:      Effort: Pulmonary effort is normal  No respiratory distress  Breath sounds: Normal breath sounds  No wheezing or rales  Chest:      Chest wall: No tenderness  Abdominal:      General: There is no distension  Palpations: Abdomen is soft  There is no mass  Tenderness: There is generalized abdominal tenderness  There is no guarding or rebound  Musculoskeletal:      Cervical back: Neck supple  Right lower leg: No edema  Left lower leg: No edema  Skin:     General: Skin is warm and dry  Neurological:      Mental Status: She is alert and oriented to person, place, and time  Psychiatric:         Mood and Affect: Mood is anxious           Behavior: Behavior normal

## 2022-06-16 NOTE — ASSESSMENT & PLAN NOTE
BP normotensive   No c/o HA, vision changes or RUQpain Ongoing weekly therapy  Medications managed by [facility]

## 2022-08-11 ENCOUNTER — OFFICE VISIT (OUTPATIENT)
Dept: INTERNAL MEDICINE CLINIC | Facility: CLINIC | Age: 22
End: 2022-08-11
Payer: COMMERCIAL

## 2022-08-11 VITALS
HEIGHT: 59 IN | RESPIRATION RATE: 16 BRPM | WEIGHT: 119.4 LBS | DIASTOLIC BLOOD PRESSURE: 66 MMHG | BODY MASS INDEX: 24.07 KG/M2 | SYSTOLIC BLOOD PRESSURE: 98 MMHG | TEMPERATURE: 97.2 F | HEART RATE: 114 BPM | OXYGEN SATURATION: 99 %

## 2022-08-11 DIAGNOSIS — J06.9 VIRAL UPPER RESPIRATORY TRACT INFECTION: Primary | ICD-10-CM

## 2022-08-11 PROCEDURE — 99213 OFFICE O/P EST LOW 20 MIN: CPT | Performed by: INTERNAL MEDICINE

## 2022-08-11 RX ORDER — VENLAFAXINE HYDROCHLORIDE 225 MG/1
225 TABLET, EXTENDED RELEASE ORAL EVERY MORNING
COMMUNITY
Start: 2022-07-15

## 2022-08-11 RX ORDER — DEXTROAMPHETAMINE SACCHARATE, AMPHETAMINE ASPARTATE MONOHYDRATE, DEXTROAMPHETAMINE SULFATE AND AMPHETAMINE SULFATE 6.25; 6.25; 6.25; 6.25 MG/1; MG/1; MG/1; MG/1
25 CAPSULE, EXTENDED RELEASE ORAL EVERY MORNING
COMMUNITY
Start: 2022-05-15

## 2022-08-11 NOTE — PROGRESS NOTES
Assessment/Plan:  Suspect viral illness, could have been COVID but she tested negative a few days ago  Explained to her that her congestion can linger but as long as she has no other symptoms and she is improving, there is no additional intervention needed       Problem List Items Addressed This Visit    None     Visit Diagnoses     Viral upper respiratory tract infection    -  Primary            Subjective:      Patient ID: Onesimo Lombardi is a 25 y o  female  HPI  Started with a nasal congestion sore throat sneezing runny nose cough body aches 8/3 No fever chills  She was around her mother > a week ago  Her mother tested + soon after and is still + but she tested negative a few days ago  She moved in with her father early in July  She is overall feeling better but still with nasal congestion, colored drainage  Using OTC spray and  APAP  At her last visit, she was c/o chest pain  This resolved on its own    The following portions of the patient's history were reviewed and updated as appropriate: allergies, current medications, past family history, past medical history, past social history, past surgical history and problem list     Review of Systems   Constitutional: Negative for chills and fever  HENT: Positive for congestion  Respiratory: Negative for cough and shortness of breath  Cardiovascular: Negative for chest pain  Gastrointestinal: Negative for abdominal pain  Objective:      BP 98/66   Pulse (!) 114   Temp (!) 97 2 °F (36 2 °C)   Resp 16   Ht 4' 11" (1 499 m)   Wt 54 2 kg (119 lb 6 4 oz)   SpO2 99%   BMI 24 12 kg/m²          Physical Exam  Constitutional:       General: She is not in acute distress  Appearance: She is well-developed  She is not ill-appearing, toxic-appearing or diaphoretic  HENT:      Head: Normocephalic and atraumatic  Right Ear: External ear normal       Left Ear: External ear normal  There is no impacted cerumen        Mouth/Throat: Pharynx: No oropharyngeal exudate or posterior oropharyngeal erythema  Eyes:      Conjunctiva/sclera: Conjunctivae normal    Cardiovascular:      Rate and Rhythm: Normal rate and regular rhythm  Heart sounds: Normal heart sounds  No murmur heard  Pulmonary:      Effort: Pulmonary effort is normal  No respiratory distress  Breath sounds: Normal breath sounds  No wheezing or rales  Abdominal:      General: There is no distension  Palpations: Abdomen is soft  There is no mass  Tenderness: There is no abdominal tenderness  There is no guarding or rebound  Musculoskeletal:      Cervical back: Neck supple  Right lower leg: No edema  Left lower leg: No edema  Skin:     General: Skin is warm and dry  Neurological:      Mental Status: She is alert and oriented to person, place, and time  Psychiatric:         Mood and Affect: Mood normal          Behavior: Behavior normal          Thought Content:  Thought content normal          Judgment: Judgment normal

## 2023-02-13 ENCOUNTER — OFFICE VISIT (OUTPATIENT)
Dept: INTERNAL MEDICINE CLINIC | Facility: CLINIC | Age: 23
End: 2023-02-13

## 2023-02-13 VITALS
DIASTOLIC BLOOD PRESSURE: 78 MMHG | RESPIRATION RATE: 16 BRPM | BODY MASS INDEX: 24.84 KG/M2 | HEART RATE: 116 BPM | HEIGHT: 59 IN | SYSTOLIC BLOOD PRESSURE: 114 MMHG | OXYGEN SATURATION: 99 % | WEIGHT: 123.2 LBS

## 2023-02-13 DIAGNOSIS — C81.19: ICD-10-CM

## 2023-02-13 DIAGNOSIS — K59.09 CHRONIC CONSTIPATION: ICD-10-CM

## 2023-02-13 DIAGNOSIS — F80.0 LISPING: ICD-10-CM

## 2023-02-13 DIAGNOSIS — Z23 NEED FOR VACCINATION: ICD-10-CM

## 2023-02-13 DIAGNOSIS — H61.23 IMPACTED CERUMEN OF BOTH EARS: ICD-10-CM

## 2023-02-13 DIAGNOSIS — E03.8 SUBCLINICAL HYPOTHYROIDISM: ICD-10-CM

## 2023-02-13 DIAGNOSIS — Z00.00 ANNUAL PHYSICAL EXAM: Primary | ICD-10-CM

## 2023-02-13 DIAGNOSIS — F84.5 ASPERGER'S DISORDER: ICD-10-CM

## 2023-02-13 PROBLEM — R11.10 NON-INTRACTABLE VOMITING: Status: RESOLVED | Noted: 2021-03-18 | Resolved: 2023-02-13

## 2023-02-13 RX ORDER — LACTULOSE 20 G/30ML
10 SOLUTION ORAL DAILY
Qty: 946 ML | Refills: 1 | Status: SHIPPED | OUTPATIENT
Start: 2023-02-13

## 2023-02-13 NOTE — PROGRESS NOTES
[facility]    NAME: Kandi Davila  AGE: 25 y o  SEX: female  : 2000     DATE: 2023     Assessment and Plan:     Problem List Items Addressed This Visit        Digestive    Chronic constipation    Relevant Medications    lactulose 20 g/30 mL       Endocrine    Subclinical hypothyroidism    Relevant Orders    CBC and differential    Comprehensive metabolic panel    TSH, 3rd generation with Free T4 reflex       Other    Asperger's disorder    Hodgkin's disease, nodular sclerosis, extranodal and solid organ sites [facility])     In remission        Other Visit Diagnoses     Annual physical exam    -  Primary    Need for vaccination        Relevant Orders    influenza vaccine, quadrivalent, 0 5 mL, preservative-free, for adult and pediatric patients 6 mos+ (AFLURIA, FLUARIX, FLULAVAL, FLUZONE) (Completed)    TDAP VACCINE GREATER THAN OR EQUAL TO 6YO IM (Completed)    Lisping        Relevant Orders    Ambulatory Referral to Speech Therapy    Impacted cerumen of both ears        Relevant Orders    Ambulatory Referral to Otolaryngology          Immunizations and preventive care screenings were discussed with patient today  Appropriate education was printed on patient's after visit summary  Counseling:  Exercise: the importance of regular exercise/physical activity was discussed  Recommend exercise 3-5 times per week for at least 30 minutes  Return in about 1 year (around 2024)  Chief Complaint:     Chief Complaint   Patient presents with   • Annual Exam      History of Present Illness:     Adult Annual Physical   Patient here for a comprehensive physical exam  The patient reports problems - lisp and would like to resume speech therapy  Diet and Physical Activity  Diet/Nutrition: well balanced diet  Exercise: strength training exercises and 1-2 times a week on average        Depression Screening  PHQ-2/9 Depression Screening         General Health  Sleep: sleeps well, gets more than 8 hours of sleep on average and taking seroquel  Hearing: decreased - left  Vision: no vision problems and most recent eye exam <1 year ago  Dental: appt on 2/15  /GYN Health    Contraceptive method: injectable contraception  Review of Systems:     Review of Systems   Constitutional: Negative for chills and fever  Respiratory: Negative for shortness of breath  Cardiovascular: Negative for chest pain and palpitations  Gastrointestinal: Positive for constipation (better on current regimen)  Negative for abdominal pain, diarrhea, nausea and vomiting  Genitourinary: Negative for difficulty urinating  Neurological: Negative for dizziness and headaches  Psychiatric/Behavioral: Negative for dysphoric mood        Past Medical History:     Past Medical History:   Diagnosis Date   • Acute cystitis without hematuria 10/8/2021   • Fatigue 4/25/2017   • [facility]    • Non-intractable vomiting 3/18/2021      Past Surgical History:     Past Surgical History:   Procedure Laterality Date   • BONE MARROW BIOPSY        Social History:     Social History     Socioeconomic History   • Marital status: Single     Spouse name: None   • Number of children: None   • Years of education: None   • Highest education level: None   Occupational History   • None   Tobacco Use   • Smoking status: Never   • Smokeless tobacco: Never   Vaping Use   • Vaping Use: Never used   Substance and Sexual Activity   • Alcohol use: Not Currently     Comment: ocassionally   • Drug use: Never   • Sexual activity: Never   Other Topics Concern   • None   Social History Narrative   • None     Social Determinants of Health     Financial Resource Strain: Not on file   Food Insecurity: Not on file   Transportation Needs: Not on file   Physical Activity: Not on file   Stress: Not on file   Social Connections: Not on file   Intimate Partner Violence: Not on file   Housing Stability: Not on file      Family History:     Family History   Problem Relation Age of Onset   • No Known Problems Mother    • Depression Father    • Bipolar disorder Father    • Diabetes Maternal Grandmother    • Lymphoma Maternal Grandmother    • Hypertension Maternal Grandfather    • Colon cancer Paternal Grandmother       Current Medications:     Current Outpatient Medications   Medication Sig Dispense Refill   • amphetamine-dextroamphetamine (ADDERALL XR) 25 MG 24 hr capsule Take 25 mg by mouth every morning     • busPIRone (BUSPAR) 15 mg tablet Take 15 mg by mouth 2 (two) times a day     • Lactobacillus Rhamnosus, GG, (Culturelle) CAPS Take 1 capsule by mouth daily 90 capsule 3   • lactulose 20 g/30 mL Take 15 mL (10 g total) by mouth daily 946 mL 1   • Linzess 290 MCG CAPS Take 1 capsule by mouth every morning     • medroxyPROGESTERone (DEPO-PROVERA) 150 mg/mL injection use as directed at physician's office every 3 months     • QUEtiapine (SEROquel) 200 mg tablet Take 1 tablet (200 mg total) by mouth daily at bedtime 30 tablet 0   • venlafaxine 225 MG TB24 24 hr tablet Take 225 mg by mouth every morning     • Cholecalciferol (Vitamin D3) 50 MCG (2000 UT) TABS Take 1 tablet (2,000 Units total) by mouth daily (Patient not taking: Reported on 2/13/2023) 90 tablet 1     No current facility-administered medications for this visit  Allergies: Allergies   Allergen Reactions   • Other Allergic Rhinitis     Seasonal allergies      Physical Exam:     /78 (BP Location: Left arm, Patient Position: Sitting, Cuff Size: Standard)   Pulse (!) 116   Resp 16   Ht 4' 11" (1 499 m)   Wt 55 9 kg (123 lb 3 2 oz)   SpO2 99%   BMI 24 88 kg/m²     Physical Exam  Constitutional:       General: She is not in acute distress  Appearance: She is well-developed  She is not ill-appearing, toxic-appearing or diaphoretic  HENT:      Head: Normocephalic and atraumatic        Right Ear: External ear normal  There is impacted cerumen  Left Ear: External ear normal  There is impacted cerumen  Eyes:      Conjunctiva/sclera: Conjunctivae normal    Cardiovascular:      Rate and Rhythm: Normal rate and regular rhythm  Heart sounds: Normal heart sounds  No murmur heard  Pulmonary:      Effort: Pulmonary effort is normal  No respiratory distress  Breath sounds: Normal breath sounds  No stridor  No wheezing or rales  Abdominal:      General: There is no distension  Palpations: Abdomen is soft  There is no mass  Tenderness: There is no abdominal tenderness  There is no guarding or rebound  Musculoskeletal:      Cervical back: Neck supple  Neurological:      Mental Status: She is alert and oriented to person, place, and time  Psychiatric:         Mood and Affect: Mood normal          Behavior: Behavior normal          Thought Content:  Thought content normal          Judgment: Judgment normal           Cheryl Vasquez MD   [facility]

## 2023-02-13 NOTE — PATIENT INSTRUCTIONS

## 2023-03-30 ENCOUNTER — TELEPHONE (OUTPATIENT)
Dept: INTERNAL MEDICINE CLINIC | Facility: CLINIC | Age: 23
End: 2023-03-30

## 2023-03-30 DIAGNOSIS — F84.5 ASPERGER'S DISORDER: Primary | ICD-10-CM

## 2023-03-30 DIAGNOSIS — F80.9 DEVELOPMENTAL SPEECH OR LANGUAGE DISORDER: ICD-10-CM

## 2023-03-30 NOTE — TELEPHONE ENCOUNTER
Chava Willson from [facility] would like the patient's referral for speech therapy to be updated with a current date  Please advise  Thank you      Please fax to [phone]    You can reach lindsay at [phone]

## 2023-05-27 ENCOUNTER — OFFICE VISIT (OUTPATIENT)
Dept: URGENT CARE | Age: 23
End: 2023-05-27

## 2023-05-27 DIAGNOSIS — N30.00 ACUTE CYSTITIS WITHOUT HEMATURIA: Primary | ICD-10-CM

## 2023-05-27 DIAGNOSIS — R35.0 URINARY FREQUENCY: ICD-10-CM

## 2023-05-27 LAB
SL AMB  POCT GLUCOSE, UA: NEGATIVE
SL AMB LEUKOCYTE ESTERASE,UA: ABNORMAL
SL AMB POCT BILIRUBIN,UA: NEGATIVE
SL AMB POCT BLOOD,UA: NEGATIVE
SL AMB POCT CLARITY,UA: ABNORMAL
SL AMB POCT COLOR,UA: YELLOW
SL AMB POCT KETONES,UA: NEGATIVE
SL AMB POCT NITRITE,UA: NEGATIVE
SL AMB POCT PH,UA: 6
SL AMB POCT SPECIFIC GRAVITY,UA: 1.02
SL AMB POCT URINE PROTEIN: NEGATIVE
SL AMB POCT UROBILINOGEN: 0.2

## 2023-05-27 RX ORDER — PHENAZOPYRIDINE HYDROCHLORIDE 200 MG/1
200 TABLET, FILM COATED ORAL
Qty: 10 TABLET | Refills: 0 | Status: SHIPPED | OUTPATIENT
Start: 2023-05-27

## 2023-05-27 RX ORDER — SULFAMETHOXAZOLE AND TRIMETHOPRIM 800; 160 MG/1; MG/1
1 TABLET ORAL EVERY 12 HOURS SCHEDULED
Qty: 10 TABLET | Refills: 0 | Status: SHIPPED | OUTPATIENT
Start: 2023-05-27 | End: 2023-06-01

## 2023-05-27 NOTE — PROGRESS NOTES
[facility]        NAME: Rosalind Cabrera is a 26 y o  female  : 2000    MRN: [id number]  DATE: May 27, 2023  TIME: 12:42 PM    Assessment and Plan   Acute cystitis without hematuria [N30 00]  1  Acute cystitis without hematuria  sulfamethoxazole-trimethoprim (BACTRIM DS) 800-160 mg per tablet    phenazopyridine (PYRIDIUM) 200 mg tablet      2  Urinary frequency  Urine culture    POCT urine dip            Patient Instructions       Follow up with PCP as needed  Increase fluids  Finish antibiotic  Chief Complaint     Chief Complaint   Patient presents with   • Possible UTI     Pt presents with frequent urination and incontinent episodes that started 3 days ago  Denies any pain and reports that urine has been clear  Reports having frequent UTI's due to having IBS and seeing a urologist one to two years ago but has been clear for a few months  History of Present Illness       Urinary Tract Infection   This is a new problem  The problem occurs every urination  The quality of the pain is described as burning  The pain is moderate  There has been no fever  She is sexually active  There is no history of pyelonephritis  Associated symptoms include frequency, hesitancy and urgency  Pertinent negatives include no chills, discharge, flank pain, hematuria, nausea, possible pregnancy, sweats or vomiting  Review of Systems   Review of Systems   Constitutional: Negative for chills  Gastrointestinal: Negative for nausea and vomiting  Genitourinary: Positive for frequency, hesitancy and urgency  Negative for flank pain and hematuria  All other systems reviewed and are negative          Current Medications       Current Outpatient Medications:   •  amphetamine-dextroamphetamine (ADDERALL XR) 25 MG 24 hr capsule, Take 25 mg by mouth every morning, Disp: , Rfl:   •  busPIRone (BUSPAR) 15 mg tablet, Take 15 mg by mouth 2 (two) times a day, Disp: , Rfl:   •  lactulose 20 g/30 mL, Take 15 mL (10 g total) by mouth daily, Disp: 946 mL, Rfl: 1  •  Linzess 290 MCG CAPS, Take 1 capsule by mouth every morning, Disp: , Rfl:   •  medroxyPROGESTERone (DEPO-PROVERA) 150 mg/mL injection, use as directed at physician's office every 3 months, Disp: , Rfl:   •  phenazopyridine (PYRIDIUM) 200 mg tablet, Take 1 tablet (200 mg total) by mouth 3 (three) times a day with meals, Disp: 10 tablet, Rfl: 0  •  QUEtiapine (SEROquel) 200 mg tablet, Take 1 tablet (200 mg total) by mouth daily at bedtime, Disp: 30 tablet, Rfl: 0  •  sulfamethoxazole-trimethoprim (BACTRIM DS) 800-160 mg per tablet, Take 1 tablet by mouth every 12 (twelve) hours for 5 days, Disp: 10 tablet, Rfl: 0  •  venlafaxine 225 MG TB24 24 hr tablet, Take 225 mg by mouth every morning, Disp: , Rfl:   •  Cholecalciferol (Vitamin D3) 50 MCG (2000 UT) TABS, Take 1 tablet (2,000 Units total) by mouth daily (Patient not taking: Reported on 2/13/2023), Disp: 90 tablet, Rfl: 1  •  Lactobacillus Rhamnosus, GG, (Culturelle) CAPS, Take 1 capsule by mouth daily (Patient not taking: Reported on 5/27/2023), Disp: 90 capsule, Rfl: 3    Current Allergies     Allergies as of 05/27/2023 - Reviewed 05/27/2023   Allergen Reaction Noted   • Other Allergic Rhinitis 08/12/2015            The following portions of the patient's history were reviewed and updated as appropriate: allergies, current medications, past family history, past medical history, past social history, past surgical history and problem list      Past Medical History:   Diagnosis Date   • Acute cystitis without hematuria 10/8/2021   • Fatigue 4/25/2017   • Hodg[facility] ([facility] )    • Non-intractable vomiting 3/18/2021       Past Surgical History:   Procedure Laterality Date   • BONE MARROW BIOPSY     • FL VCUG VOIDING URETHROCYSTOGRAM  1/2/2019       Family History   Problem Relation Age of Onset   • No Known Problems Mother    • Depression Father    • Bipolar disorder Father    • Diabetes Maternal Grandmother    • Lymphoma Maternal Grandmother    • Hypertension Maternal Grandfather    • Colon cancer Paternal Grandmother          Medications have been verified  Objective   There were no vitals taken for this visit  No LMP recorded  Physical Exam     Physical Exam  Vitals and nursing note reviewed  Constitutional:       Appearance: Normal appearance  She is normal weight  Cardiovascular:      Rate and Rhythm: Normal rate and regular rhythm  Pulses: Normal pulses  Heart sounds: Normal heart sounds  Pulmonary:      Effort: Pulmonary effort is normal       Breath sounds: Normal breath sounds  Abdominal:      General: Bowel sounds are normal       Palpations: Abdomen is soft  Tenderness: There is abdominal tenderness ( suprapubic)  There is no right CVA tenderness or left CVA tenderness  Neurological:      Mental Status: She is alert

## 2023-05-30 LAB — BACTERIA UR CULT: ABNORMAL

## 2023-06-05 ENCOUNTER — HOSPITAL ENCOUNTER (OUTPATIENT)
Dept: RADIOLOGY | Facility: HOSPITAL | Age: 23
Discharge: HOME | End: 2023-06-05
Payer: COMMERCIAL

## 2023-06-05 DIAGNOSIS — R06.02 SHORTNESS OF BREATH: ICD-10-CM

## 2023-06-05 DIAGNOSIS — R00.0 TACHYCARDIA, UNSPECIFIED: ICD-10-CM

## 2023-06-05 DIAGNOSIS — Z85.118 PERSONAL HISTORY OF MALIGNANT NEOPLASM OF BRONCHUS AND LUNG: ICD-10-CM

## 2023-06-05 PROCEDURE — 71046 X-RAY EXAM CHEST 2 VIEWS: CPT

## 2023-06-13 ENCOUNTER — RA CDI HCC (OUTPATIENT)
Dept: OTHER | Facility: HOSPITAL | Age: 23
End: 2023-06-13

## 2023-06-13 NOTE — PROGRESS NOTES
[facility]  coding opportunities          Chart Reviewed number of suggestions sent to Provider: 2   [phone]  [id number] a      Patients Insurance        Commercial Insurance: [address] [place] [address]

## 2023-06-15 RX ORDER — ALBUTEROL SULFATE 90 UG/1
AEROSOL, METERED RESPIRATORY (INHALATION)
COMMUNITY
Start: 2023-06-03

## 2023-06-15 RX ORDER — PROPAFENONE HYDROCHLORIDE 150 MG/1
TABLET, COATED ORAL
COMMUNITY
Start: 2023-06-03 | End: 2023-06-20

## 2023-06-16 DIAGNOSIS — K59.09 CHRONIC CONSTIPATION: ICD-10-CM

## 2023-06-16 RX ORDER — LACTULOSE 10 G/15ML
SOLUTION ORAL
Qty: 946 ML | Refills: 1 | Status: SHIPPED | OUTPATIENT
Start: 2023-06-16

## 2023-06-20 ENCOUNTER — OFFICE VISIT (OUTPATIENT)
Dept: INTERNAL MEDICINE CLINIC | Facility: CLINIC | Age: 23
End: 2023-06-20
Payer: COMMERCIAL

## 2023-06-20 VITALS
WEIGHT: 125.4 LBS | BODY MASS INDEX: 25.28 KG/M2 | SYSTOLIC BLOOD PRESSURE: 114 MMHG | OXYGEN SATURATION: 98 % | HEART RATE: 108 BPM | DIASTOLIC BLOOD PRESSURE: 70 MMHG | HEIGHT: 59 IN

## 2023-06-20 DIAGNOSIS — E03.8 SUBCLINICAL HYPOTHYROIDISM: ICD-10-CM

## 2023-06-20 DIAGNOSIS — E55.9 VITAMIN D INSUFFICIENCY: ICD-10-CM

## 2023-06-20 DIAGNOSIS — R06.02 SHORTNESS OF BREATH: Primary | ICD-10-CM

## 2023-06-20 PROCEDURE — 99214 OFFICE O/P EST MOD 30 MIN: CPT | Performed by: INTERNAL MEDICINE

## 2023-06-20 RX ORDER — CHOLECALCIFEROL (VITAMIN D3) 125 MCG
2000 CAPSULE ORAL DAILY
Qty: 90 TABLET | Refills: 1 | Status: SHIPPED | OUTPATIENT
Start: 2023-06-20

## 2023-06-20 NOTE — PROGRESS NOTES
Assessment/Plan:  Normal PFTs and echo in 2019, normal CXRs  Unclear etiology of symptoms but suspect anxiety  Due to h/o Hodgkins, chemo and radiation, obtain PFTs , echo and Holter  Advised to reestablish with pulmonology as well  Advised to call psychiatry about anxiety and for a medication to take PRN     Problem List Items Addressed This Visit        Endocrine    Subclinical hypothyroidism    Relevant Orders    CBC and differential    Comprehensive metabolic panel    TSH, 3rd generation with Free T4 reflex       Other    Vitamin D insufficiency    Relevant Medications    Cholecalciferol (Vitamin D3) 50 MCG (2000 UT) TABS    Other Relevant Orders    Vitamin D 25 hydroxy   Other Visit Diagnoses     Shortness of breath    -  Primary    Relevant Orders    Holter monitor    Echo complete w/ contrast if indicated    Ambulatory Referral to Pulmonology    Complete PFT without post bronchodilator            Subjective:      Patient ID: Krissy Fraser is a 26 y o  female  HPI  Here with her mother  C/o >1 year of chest pressure, SOB, feels like she is suffocating at night  She cannot take a deep breath  It is unrelated to laying down  It happens about once a week  She went to urgent care a month ago  and EKG showed sinus tachycardia  She was prescribed albuterol and propafenone, used both once without relief  CXR normal  Denies that it is anxiety driven    The following portions of the patient's history were reviewed and updated as appropriate: allergies, current medications, past family history, past medical history, past social history, past surgical history and problem list     Review of Systems   Constitutional: Negative for fever  HENT: Negative for ear pain, rhinorrhea and sinus pressure  Respiratory: Positive for shortness of breath  Negative for cough and wheezing  Cardiovascular: Negative for chest pain and leg swelling  Gastrointestinal: Negative for abdominal pain, constipation and vomiting  "  Musculoskeletal: Negative for neck pain  Skin: Negative for rash  Neurological: Negative for headaches  Objective:      /70 (BP Location: Left arm, Patient Position: Sitting, Cuff Size: Standard)   Pulse (!) 108   Ht 4' 11\" (1 499 m)   Wt 56 9 kg (125 lb 6 4 oz)   SpO2 98%   BMI 25 33 kg/m²          Physical Exam  Constitutional:       Appearance: She is well-developed  She is not ill-appearing  Eyes:      Conjunctiva/sclera: Conjunctivae normal    Cardiovascular:      Rate and Rhythm: Normal rate and regular rhythm  Heart sounds: Normal heart sounds  No murmur heard  Pulmonary:      Effort: Pulmonary effort is normal  No respiratory distress  Breath sounds: Normal breath sounds  No wheezing or rales  Abdominal:      General: There is no distension  Palpations: Abdomen is soft  There is no mass  Tenderness: There is no abdominal tenderness  There is no guarding or rebound  Musculoskeletal:      Cervical back: Neck supple  Right lower leg: No edema  Left lower leg: No edema  Neurological:      Mental Status: She is alert and oriented to person, place, and time  Psychiatric:         Mood and Affect: Mood normal          Behavior: Behavior normal          Thought Content:  Thought content normal          Judgment: Judgment normal          Answers for HPI/ROS submitted by the patient on 6/19/2023  Chronicity: chronic  claudication: No  coryza: No  hemoptysis: No  leg pain: No  PND: Yes  sputum production: No  swollen glands: No  syncope: No      "

## 2023-07-31 ENCOUNTER — HOSPITAL ENCOUNTER (OUTPATIENT)
Dept: NON INVASIVE DIAGNOSTICS | Facility: HOSPITAL | Age: 23
Discharge: HOME | End: 2023-07-31
Payer: COMMERCIAL

## 2023-07-31 VITALS
DIASTOLIC BLOOD PRESSURE: 70 MMHG | SYSTOLIC BLOOD PRESSURE: 114 MMHG | WEIGHT: 125 LBS | BODY MASS INDEX: 25.2 KG/M2 | HEIGHT: 59 IN | HEART RATE: 94 BPM

## 2023-07-31 VITALS
HEART RATE: 94 BPM | SYSTOLIC BLOOD PRESSURE: 114 MMHG | BODY MASS INDEX: 25.2 KG/M2 | HEIGHT: 59 IN | WEIGHT: 125 LBS | DIASTOLIC BLOOD PRESSURE: 70 MMHG

## 2023-07-31 DIAGNOSIS — R06.02 SHORTNESS OF BREATH: ICD-10-CM

## 2023-07-31 PROCEDURE — 93226 XTRNL ECG REC<48 HR SCAN A/R: CPT

## 2023-07-31 PROCEDURE — 93225 XTRNL ECG REC<48 HRS REC: CPT

## 2023-07-31 PROCEDURE — 93306 TTE W/DOPPLER COMPLETE: CPT

## 2023-08-01 LAB
AORTIC ROOT: 2.6 CM
AORTIC VALVE MEAN VELOCITY: 8.1 M/S
APICAL FOUR CHAMBER EJECTION FRACTION: 53 %
ASCENDING AORTA: 2.6 CM
AV AREA BY CONTINUOUS VTI: 1.9 CM2
AV AREA PEAK VELOCITY: 1.8 CM2
AV LVOT MEAN GRADIENT: 2 MMHG
AV LVOT PEAK GRADIENT: 4 MMHG
AV MEAN GRADIENT: 3 MMHG
AV PEAK GRADIENT: 6 MMHG
AV VALVE AREA: 1.86 CM2
AV VELOCITY RATIO: 0.8
DOP CALC AO PEAK VEL: 1.17 M/S
DOP CALC AO VTI: 21.07 CM
DOP CALC LVOT AREA: 2.27 CM2
DOP CALC LVOT CARDIAC INDEX: 2.34 L/MIN/M2
DOP CALC LVOT CARDIAC OUTPUT: 3.53 L/MIN
DOP CALC LVOT DIAMETER: 1.7 CM
DOP CALC LVOT PEAK VEL VTI: 17.26 CM
DOP CALC LVOT PEAK VEL: 0.94 M/S
DOP CALC LVOT STROKE INDEX: 25.2 ML/M2
DOP CALC LVOT STROKE VOLUME: 39.16 CM3
E WAVE DECELERATION TIME: 227 MS
FRACTIONAL SHORTENING: 28 % (ref 28–44)
INTERVENTRICULAR SEPTUM IN DIASTOLE (PARASTERNAL SHORT AXIS VIEW): 0.7 CM
INTERVENTRICULAR SEPTUM: 0.7 CM (ref 0.6–1.1)
LAAS-AP2: 13.9 CM2
LAAS-AP4: 12.6 CM2
LEFT ATRIUM SIZE: 2.5 CM
LEFT ATRIUM VOLUME (MOD BIPLANE): 32 ML
LEFT INTERNAL DIMENSION IN SYSTOLE: 2.8 CM (ref 2.1–4)
LEFT VENTRICLE DIASTOLIC VOLUME (MOD BIPLANE): 80 ML
LEFT VENTRICLE SYSTOLIC VOLUME (MOD BIPLANE): 38 ML
LEFT VENTRICULAR INTERNAL DIMENSION IN DIASTOLE: 3.9 CM (ref 3.5–6)
LEFT VENTRICULAR POSTERIOR WALL IN END DIASTOLE: 0.7 CM
LEFT VENTRICULAR STROKE VOLUME: 37 ML
LV EF: 52 %
LVSV (TEICH): 37 ML
MV E'TISSUE VEL-SEP: 14 CM/S
MV PEAK A VEL: 0.61 M/S
MV PEAK E VEL: 80 CM/S
RIGHT ATRIUM AREA SYSTOLE A4C: 9.2 CM2
RIGHT VENTRICLE ID DIMENSION: 1.8 CM
SL CV LEFT ATRIUM LENGTH A2C: 4.2 CM
SL CV LV EF: 55
SL CV PED ECHO LEFT VENTRICLE DIASTOLIC VOLUME (MOD BIPLANE) 2D: 67 ML
SL CV PED ECHO LEFT VENTRICLE SYSTOLIC VOLUME (MOD BIPLANE) 2D: 31 ML
TR MAX PG: 26 MMHG
TR PEAK VELOCITY: 2.5 M/S
TRICUSPID ANNULAR PLANE SYSTOLIC EXCURSION: 2.3 CM
TRICUSPID VALVE PEAK REGURGITATION VELOCITY: 2.53 M/S

## 2023-08-01 PROCEDURE — 93306 TTE W/DOPPLER COMPLETE: CPT | Performed by: STUDENT IN AN ORGANIZED HEALTH CARE EDUCATION/TRAINING PROGRAM

## 2023-08-07 PROCEDURE — 93227 XTRNL ECG REC<48 HR R&I: CPT | Performed by: INTERNAL MEDICINE

## 2023-08-11 ENCOUNTER — OFFICE VISIT (OUTPATIENT)
Dept: URGENT CARE | Age: 23
End: 2023-08-11
Payer: COMMERCIAL

## 2023-08-11 VITALS
BODY MASS INDEX: 25.6 KG/M2 | OXYGEN SATURATION: 98 % | DIASTOLIC BLOOD PRESSURE: 68 MMHG | SYSTOLIC BLOOD PRESSURE: 111 MMHG | HEART RATE: 106 BPM | WEIGHT: 127 LBS | RESPIRATION RATE: 17 BRPM | HEIGHT: 59 IN | TEMPERATURE: 100.2 F

## 2023-08-11 DIAGNOSIS — R30.0 DYSURIA: ICD-10-CM

## 2023-08-11 DIAGNOSIS — R35.0 FREQUENCY OF MICTURITION: Primary | ICD-10-CM

## 2023-08-11 LAB
SL AMB  POCT GLUCOSE, UA: NEGATIVE
SL AMB LEUKOCYTE ESTERASE,UA: NEGATIVE
SL AMB POCT BILIRUBIN,UA: NEGATIVE
SL AMB POCT BLOOD,UA: NEGATIVE
SL AMB POCT CLARITY,UA: NORMAL
SL AMB POCT COLOR,UA: YELLOW
SL AMB POCT KETONES,UA: NEGATIVE
SL AMB POCT NITRITE,UA: NEGATIVE
SL AMB POCT PH,UA: 6.5
SL AMB POCT SPECIFIC GRAVITY,UA: 1
SL AMB POCT URINE PROTEIN: NEGATIVE
SL AMB POCT UROBILINOGEN: 0.2

## 2023-08-11 PROCEDURE — 87077 CULTURE AEROBIC IDENTIFY: CPT | Performed by: EMERGENCY MEDICINE

## 2023-08-11 PROCEDURE — 87186 SC STD MICRODIL/AGAR DIL: CPT | Performed by: EMERGENCY MEDICINE

## 2023-08-11 PROCEDURE — 81002 URINALYSIS NONAUTO W/O SCOPE: CPT | Performed by: EMERGENCY MEDICINE

## 2023-08-11 PROCEDURE — 99213 OFFICE O/P EST LOW 20 MIN: CPT | Performed by: EMERGENCY MEDICINE

## 2023-08-11 PROCEDURE — 87086 URINE CULTURE/COLONY COUNT: CPT | Performed by: EMERGENCY MEDICINE

## 2023-08-11 RX ORDER — METHYLPHENIDATE HYDROCHLORIDE 18 MG/1
18 TABLET ORAL DAILY
COMMUNITY

## 2023-08-11 RX ORDER — CEPHALEXIN 500 MG/1
500 CAPSULE ORAL 2 TIMES DAILY
Qty: 14 CAPSULE | Refills: 0 | Status: SHIPPED | OUTPATIENT
Start: 2023-08-11 | End: 2023-08-18

## 2023-08-11 NOTE — PROGRESS NOTES
[facility]        NAME: Doreatha Carrel is a 26 y.o. female  : 2000    MRN: [id number]  DATE: 2023  TIME: 1:32 PM    Assessment and Plan   Frequency of micturition [R35.0]  1. Frequency of micturition  POCT urine dip    cephalexin (KEFLEX) 500 mg capsule    Urine culture      2. Dysuria          -Patient nontoxic-appearing and otherwise hemodynamically stable in clinic, patient states that she is extremely anxious at baseline and more so as she is in the clinic and being evaluated, manual pulse measurements 100 bpm  -Reviewed recent Holter monitor information, sinus tachycardia noted with occasional PACs and PVCs, no arrhythmias were detected on recent work-up  -We will treat for presumptive urinary tract infection given symptoms at this time pending urine culture, reviewed prior sensitivities and cultures  -Advised patient if she develops worsening symptoms to include high fevers, rigors, vomiting palpitations or lightheadedness to call 911 and seek care in the ED for further evaluation otherwise follow-up with her PCP as directed  -All questions answered at bedside, patient as well as mother at bedside are amenable to plan and voiced understanding    Patient Instructions       Follow up with PCP in 3-5 days. Proceed to  ER if symptoms worsen. Chief Complaint     Chief Complaint   Patient presents with   • Possible UTI     Pt w/ urinary frequency/oliguria approx 5 dys. Denies dysuria, discharge/pressure. PO intake same. Also rash across upper abd/below breast after Holter/Echo , itch improving         History of Present Illness       70-year-old female with history of generalized anxiety disorder, prior urinary tract infections, Hodgkin's lymphoma currently in remission presents with chief complaint of 2 days of urinary frequency, hesitancy and burning on urination. She denies associated vaginal discharge, abnormal vaginal bleeding or vaginal itching.   Denies abdominal or flank pain. Denies fever, rigors nausea vomiting or diarrhea at home. Of note patient recently had 2 teeth extracted 2 days ago and is currently on Percocet for pain as needed. States that she had a recent work-up for unspecified tachycardia with Holter monitor, EKG and echocardiogram and has developed a localized erythematous rash where the electrodes were placed for her twelve-lead EKG. She denies associated wheezing, difficulty breathing or throat tightness. Difficulty Urinating   This is a recurrent problem. The current episode started in the past 7 days. The problem occurs intermittently. The problem has been unchanged. The quality of the pain is described as aching. The pain is at a severity of 2/10. The pain is mild. There has been no fever. Associated symptoms include frequency, hesitancy and urgency. Pertinent negatives include no chills, discharge, flank pain, hematuria, nausea, possible pregnancy, sweats or vomiting. She has tried nothing for the symptoms. Review of Systems   Review of Systems   Constitutional: Negative for activity change, appetite change, chills, diaphoresis, fatigue and fever. HENT: Negative for congestion, dental problem, drooling, ear discharge, ear pain, facial swelling, hearing loss, mouth sores and sore throat. Eyes: Negative for pain and visual disturbance. Respiratory: Negative for cough, shortness of breath, wheezing and stridor. Cardiovascular: Negative for chest pain and palpitations. Gastrointestinal: Negative for abdominal pain, nausea and vomiting. Genitourinary: Positive for dysuria, frequency, hesitancy and urgency. Negative for decreased urine volume, difficulty urinating, dyspareunia, enuresis, flank pain, genital sores, hematuria, menstrual problem, pelvic pain, vaginal bleeding, vaginal discharge and vaginal pain. Musculoskeletal: Negative for arthralgias and back pain. Skin: Negative for color change and rash.    Neurological: Negative for seizures and syncope. All other systems reviewed and are negative.         Current Medications       Current Outpatient Medications:   •  albuterol (PROVENTIL HFA,VENTOLIN HFA) 90 mcg/act inhaler, PRN, Disp: , Rfl:   •  busPIRone (BUSPAR) 15 mg tablet, Take 15 mg by mouth 2 (two) times a day, Disp: , Rfl:   •  cephalexin (KEFLEX) 500 mg capsule, Take 1 capsule (500 mg total) by mouth 2 (two) times a day for 7 days, Disp: 14 capsule, Rfl: 0  •  Cholecalciferol (Vitamin D3) 50 MCG (2000 UT) TABS, Take 1 tablet (2,000 Units total) by mouth daily, Disp: 90 tablet, Rfl: 1  •  Lactobacillus Rhamnosus, GG, (Culturelle) CAPS, Take 1 capsule by mouth daily, Disp: 90 capsule, Rfl: 3  •  lactulose (CHRONULAC) 10 g/15 mL solution, take 15 milliliters by mouth once daily, Disp: 946 mL, Rfl: 1  •  Linzess 290 MCG CAPS, Take 1 capsule by mouth every morning, Disp: , Rfl:   •  medroxyPROGESTERone (DEPO-PROVERA) 150 mg/mL injection, use as directed at physician's office every 3 months, Disp: , Rfl:   •  methylphenidate (CONCERTA) 18 mg ER tablet, Take 18 mg by mouth daily, Disp: , Rfl:   •  phenazopyridine (PYRIDIUM) 200 mg tablet, Take 1 tablet (200 mg total) by mouth 3 (three) times a day with meals, Disp: 10 tablet, Rfl: 0  •  QUEtiapine (SEROquel) 200 mg tablet, Take 1 tablet (200 mg total) by mouth daily at bedtime, Disp: 30 tablet, Rfl: 0  •  venlafaxine 225 MG TB24 24 hr tablet, Take 225 mg by mouth every morning, Disp: , Rfl:   •  amphetamine-dextroamphetamine (ADDERALL XR) 25 MG 24 hr capsule, Take 25 mg by mouth every morning (Patient not taking: Reported on 8/11/2023), Disp: , Rfl:     Current Allergies     Allergies as of 08/11/2023 - Reviewed 08/11/2023   Allergen Reaction Noted   • Other Allergic Rhinitis 08/12/2015            The following portions of the patient's history were reviewed and updated as appropriate: allergies, current medications, past family history, past medical history, past social history, past surgical history and problem list.     Past Medical History:   Diagnosis Date   • Acute cystitis without hematuria 10/08/2021   • Allergic    • Anxiety    • Cancer ([address])    • Fatigue 04/25/2017   • Hodgkin lymphoma ([address])    • Non-intractable vomiting 03/18/2021       Past Surgical History:   Procedure Laterality Date   • BONE MARROW BIOPSY     • FL VCUG VOIDING URETHROCYSTOGRAM  1/2/2019       Family History   Problem Relation Age of Onset   • No Known Problems Mother    • Depression Father    • Bipolar disorder Father    • Diabetes Maternal Grandmother    • Lymphoma Maternal Grandmother    • Hypertension Maternal Grandfather    • Colon cancer Paternal Grandmother          Medications have been verified. Objective   /68   Pulse (!) 106 Comment: irregular  Temp 100.2 °F (37.9 °C)   Resp 17   Ht 4' 11" (1.499 m)   Wt 57.6 kg (127 lb)   LMP  (LMP Unknown)   SpO2 98%   BMI 25.65 kg/m²   No LMP recorded (lmp unknown). Patient has had an injection. Physical Exam     Physical Exam  Vitals and nursing note reviewed. Exam conducted with a chaperone present. Constitutional:       General: She is not in acute distress. Appearance: She is not ill-appearing, toxic-appearing or diaphoretic. HENT:      Head: Normocephalic and atraumatic. Mouth/Throat:      Mouth: Mucous membranes are moist.      Pharynx: No oropharyngeal exudate or posterior oropharyngeal erythema. Cardiovascular:      Rate and Rhythm: Regular rhythm. Tachycardia present. Pulmonary:      Effort: Pulmonary effort is normal.      Breath sounds: Normal breath sounds. Abdominal:      General: Abdomen is flat. There is no distension. Tenderness: There is no abdominal tenderness. There is no right CVA tenderness, left CVA tenderness, guarding or rebound. Hernia: No hernia is present. Musculoskeletal:         General: No swelling, tenderness, deformity or signs of injury.       Cervical back: Normal range of motion and neck supple. Right lower leg: No edema. Left lower leg: No edema. Skin:     Capillary Refill: Capillary refill takes less than 2 seconds. Findings: Erythema and rash present. No laceration. Neurological:      General: No focal deficit present. Mental Status: She is alert.

## 2023-08-14 LAB — BACTERIA UR CULT: ABNORMAL

## 2023-08-30 ENCOUNTER — VBI (OUTPATIENT)
Dept: ADMINISTRATIVE | Facility: OTHER | Age: 23
End: 2023-08-30

## 2023-09-14 ENCOUNTER — TELEPHONE (OUTPATIENT)
Dept: OTHER | Facility: HOSPITAL | Age: 23
End: 2023-09-14

## 2023-09-14 DIAGNOSIS — F84.5 ASPERGER'S DISORDER: Primary | ICD-10-CM

## 2023-09-14 DIAGNOSIS — F42.2 MIXED OBSESSIONAL THOUGHTS AND ACTS: ICD-10-CM

## 2023-09-14 NOTE — TELEPHONE ENCOUNTER
Patient called asking for a recommendation for a psychiatrist. She is asking if she needs to come in for an appointment or if the referral can be placed. Please advise. She is asking for a return phone call.

## 2023-10-02 ENCOUNTER — VBI (OUTPATIENT)
Dept: INTERNAL MEDICINE CLINIC | Facility: CLINIC | Age: 23
End: 2023-10-02

## 2023-10-02 NOTE — TELEPHONE ENCOUNTER
10/02/23 1:06 PM    Patient contacted post ED visit, [facility] department spoke with patient/caregiver and outreach was successful. Thank you.   David Roberts MA  [facility]

## 2023-10-05 ENCOUNTER — TELEPHONE (OUTPATIENT)
Dept: PSYCHIATRY | Facility: CLINIC | Age: 23
End: 2023-10-05

## 2023-10-05 NOTE — TELEPHONE ENCOUNTER
Patient has been added to the Medication Management wait list with a referral.    Insurance: [employer]  Insurance Type:    Commercial []   Medicaid [x]   [place] (if applicable)   Medicare []  Location Preference: [place]  Provider Preference: no prov pref  Virtual: Yes [] No [x]  Were outside resources sent: Yes [] No [x]

## 2023-10-16 ENCOUNTER — TELEPHONE (OUTPATIENT)
Age: 23
End: 2023-10-16

## 2023-10-16 NOTE — TELEPHONE ENCOUNTER
Patient called and states is discharged from her psychiatrist and is on waiting list for another. In the mean time wanted Dr Huyen Bray to know she was discharged and wanted to know if can manage her medication. Did schedule for 10/19/2023 appointment to discuss, may need to call and reschedule if can't get a ride.

## 2023-10-19 ENCOUNTER — OFFICE VISIT (OUTPATIENT)
Dept: INTERNAL MEDICINE CLINIC | Facility: CLINIC | Age: 23
End: 2023-10-19
Payer: COMMERCIAL

## 2023-10-19 VITALS
HEIGHT: 59 IN | DIASTOLIC BLOOD PRESSURE: 70 MMHG | OXYGEN SATURATION: 99 % | HEART RATE: 106 BPM | SYSTOLIC BLOOD PRESSURE: 114 MMHG | BODY MASS INDEX: 25.96 KG/M2 | WEIGHT: 128.8 LBS

## 2023-10-19 DIAGNOSIS — R06.02 SHORTNESS OF BREATH: ICD-10-CM

## 2023-10-19 DIAGNOSIS — F33.9 EPISODE OF RECURRENT MAJOR DEPRESSIVE DISORDER, UNSPECIFIED DEPRESSION EPISODE SEVERITY (HCC): Primary | ICD-10-CM

## 2023-10-19 DIAGNOSIS — F90.2 ATTENTION DEFICIT HYPERACTIVITY DISORDER (ADHD), COMBINED TYPE: ICD-10-CM

## 2023-10-19 DIAGNOSIS — J45.20 MILD INTERMITTENT ASTHMA WITHOUT COMPLICATION: ICD-10-CM

## 2023-10-19 PROCEDURE — 99214 OFFICE O/P EST MOD 30 MIN: CPT | Performed by: INTERNAL MEDICINE

## 2023-10-19 RX ORDER — QUETIAPINE FUMARATE 200 MG/1
200 TABLET, FILM COATED ORAL
Qty: 30 TABLET | Refills: 4 | Status: SHIPPED | OUTPATIENT
Start: 2023-10-19

## 2023-10-19 NOTE — PROGRESS NOTES
Name: Janis Menjivar      : 2000      MRN: [id number]  Encounter Provider: Ish Magana MD  Encounter Date: 10/19/2023   Encounter department: [facility]    Assessment & Plan     1. Episode of recurrent major depressive disorder, unspecified depression episode severity ([address])  Assessment & Plan:  Stable on venlafaxine buspirone and Seroquel  She will remain on the wait list with psychiatry and follow-up here as scheduled in 3 months    Orders:  -     QUEtiapine (SEROquel) 200 mg tablet; Take 1 tablet (200 mg total) by mouth daily at bedtime    2. Attention deficit hyperactivity disorder (ADHD), combined type  Assessment & Plan:  Doing better on Concerta versus Adderall  Urine drug screen obtained today, contract signed    Orders:  -     Pain Management, Profile 6 With Confirmation    3. Mild intermittent asthma without complication  -     Complete PFT with post bronchodilator; Future    4. Shortness of breath  -     Complete PFT with post bronchodilator; Future         Subjective      Patient here with her mother for a follow-up. She has been a patient at [facility] but her provider is no longer available and she is unable to get in with anyone new. She is on a wait list to see [facility] psychiatry. She has Aspergers depression OCD and ADHD. She has been on buspirone 15 mg twice a day, Seroquel 200 mg at bedtime and venlafaxine to 25 mg a day. She was switched to Concerta 18 mg a day in August from Adderall 25 mg a day which did not seem to be helpful anymore. She is doing well on Concerta. Reports that she continues to have shortness of breath. Her chest x-ray echo and Holter are normal.  She has an order for pulmonary function test but has not scheduled. Review of Systems   Respiratory:  Positive for shortness of breath. Cardiovascular:  Negative for chest pain. Gastrointestinal:  Negative for abdominal pain.    Psychiatric/Behavioral:  Negative for hallucinations, sleep disturbance and suicidal ideas. The patient is nervous/anxious. Current Outpatient Medications on File Prior to Visit   Medication Sig   • albuterol (PROVENTIL HFA,VENTOLIN HFA) 90 mcg/act inhaler PRN   • busPIRone (BUSPAR) 15 mg tablet Take 15 mg by mouth 2 (two) times a day   • Cholecalciferol (Vitamin D3) 50 MCG (2000 UT) TABS Take 1 tablet (2,000 Units total) by mouth daily   • Lactobacillus Rhamnosus, GG, (Culturelle) CAPS Take 1 capsule by mouth daily   • lactulose (CHRONULAC) 10 g/15 mL solution take 15 milliliters by mouth once daily   • Linzess 290 MCG CAPS Take 1 capsule by mouth every morning   • medroxyPROGESTERone (DEPO-PROVERA) 150 mg/mL injection use as directed at physician's office every 3 months   • methylphenidate (CONCERTA) 18 mg ER tablet Take 18 mg by mouth daily   • venlafaxine 225 MG TB24 24 hr tablet Take 225 mg by mouth every morning       Objective     /70 (BP Location: Left arm, Patient Position: Sitting, Cuff Size: Standard)   Pulse (!) 106   Ht 4' 11" (1.499 m)   Wt 58.4 kg (128 lb 12.8 oz)   SpO2 99%   BMI 26.01 kg/m²     Physical Exam  Constitutional:       Appearance: She is not ill-appearing. Cardiovascular:      Rate and Rhythm: Regular rhythm. Heart sounds: Normal heart sounds. Pulmonary:      Breath sounds: Normal breath sounds. Abdominal:      Palpations: Abdomen is soft. Tenderness: There is no abdominal tenderness.    Psychiatric:         Mood and Affect: Mood normal.         Behavior: Behavior normal.       Cindy Montoya MD

## 2023-10-20 LAB
6MAM UR QL: NEGATIVE NG/ML
AMPHETAMINES UR QL: NEGATIVE NG/ML
BARBITURATES UR QL: NEGATIVE NG/ML
BENZODIAZ UR QL: NEGATIVE NG/ML
BZE UR QL: NEGATIVE NG/ML
CREAT UR-MCNC: 190.5 MG/DL
ETHANOL UR QL: NEGATIVE NG/ML
METHADONE UR QL: NEGATIVE NG/ML
OPIATES UR QL: NEGATIVE NG/ML
OXIDANTS UR QL: NEGATIVE MCG/ML
OXYCODONE UR QL: NEGATIVE NG/ML
PCP UR QL: NEGATIVE NG/ML
PH UR: 5.3 [PH] (ref 4.5–9)
THC UR QL: NEGATIVE NG/ML

## 2023-10-24 NOTE — ASSESSMENT & PLAN NOTE
Stable on venlafaxine buspirone and Seroquel  She will remain on the wait list with psychiatry and follow-up here as scheduled in 3 months

## 2023-11-18 DIAGNOSIS — K59.09 CHRONIC CONSTIPATION: ICD-10-CM

## 2023-11-20 RX ORDER — LACTULOSE 10 G/15ML
SOLUTION ORAL
Qty: 946 ML | Refills: 2 | Status: SHIPPED | OUTPATIENT
Start: 2023-11-20

## 2023-11-30 ENCOUNTER — HOSPITAL ENCOUNTER (OUTPATIENT)
Dept: PULMONOLOGY | Facility: HOSPITAL | Age: 23
End: 2023-11-30
Payer: COMMERCIAL

## 2023-11-30 DIAGNOSIS — R06.02 SHORTNESS OF BREATH: ICD-10-CM

## 2023-11-30 DIAGNOSIS — J45.20 MILD INTERMITTENT ASTHMA WITHOUT COMPLICATION: ICD-10-CM

## 2023-11-30 PROCEDURE — 94010 BREATHING CAPACITY TEST: CPT

## 2023-11-30 PROCEDURE — 94729 DIFFUSING CAPACITY: CPT | Performed by: STUDENT IN AN ORGANIZED HEALTH CARE EDUCATION/TRAINING PROGRAM

## 2023-11-30 PROCEDURE — 94010 BREATHING CAPACITY TEST: CPT | Performed by: STUDENT IN AN ORGANIZED HEALTH CARE EDUCATION/TRAINING PROGRAM

## 2023-11-30 PROCEDURE — 94729 DIFFUSING CAPACITY: CPT

## 2023-11-30 RX ORDER — ALBUTEROL SULFATE 2.5 MG/3ML
2.5 SOLUTION RESPIRATORY (INHALATION) ONCE AS NEEDED
Status: DISCONTINUED | OUTPATIENT
Start: 2023-11-30 | End: 2023-12-04 | Stop reason: HOSPADM

## 2024-02-19 DIAGNOSIS — F33.9 EPISODE OF RECURRENT MAJOR DEPRESSIVE DISORDER, UNSPECIFIED DEPRESSION EPISODE SEVERITY (HCC): ICD-10-CM

## 2024-02-20 RX ORDER — QUETIAPINE FUMARATE 200 MG/1
200 TABLET, FILM COATED ORAL
Qty: 30 TABLET | Refills: 3 | Status: SHIPPED | OUTPATIENT
Start: 2024-02-20 | End: 2024-02-22 | Stop reason: SDUPTHER

## 2024-02-22 DIAGNOSIS — F33.9 EPISODE OF RECURRENT MAJOR DEPRESSIVE DISORDER, UNSPECIFIED DEPRESSION EPISODE SEVERITY (HCC): ICD-10-CM

## 2024-02-22 RX ORDER — QUETIAPINE FUMARATE 200 MG/1
200 TABLET, FILM COATED ORAL
Qty: 30 TABLET | Refills: 3 | Status: SHIPPED | OUTPATIENT
Start: 2024-02-22

## 2024-02-29 ENCOUNTER — TELEPHONE (OUTPATIENT)
Age: 24
End: 2024-02-29

## 2024-02-29 DIAGNOSIS — F33.9 EPISODE OF RECURRENT MAJOR DEPRESSIVE DISORDER, UNSPECIFIED DEPRESSION EPISODE SEVERITY (HCC): ICD-10-CM

## 2024-02-29 DIAGNOSIS — F42.2 MIXED OBSESSIONAL THOUGHTS AND ACTS: ICD-10-CM

## 2024-02-29 DIAGNOSIS — F90.2 ATTENTION DEFICIT HYPERACTIVITY DISORDER (ADHD), COMBINED TYPE: ICD-10-CM

## 2024-02-29 DIAGNOSIS — F84.5 ASPERGER'S DISORDER: Primary | ICD-10-CM

## 2024-02-29 NOTE — TELEPHONE ENCOUNTER
PT needs dr to dr referral for psychiatry for [facility] either [facility] or [facility] with autism, ADHD and anxiety disorder. Please send message per my chart pt when completed.

## 2024-03-22 ENCOUNTER — TELEPHONE (OUTPATIENT)
Age: 24
End: 2024-03-22

## 2024-04-12 ENCOUNTER — OFFICE VISIT (OUTPATIENT)
Dept: INTERNAL MEDICINE CLINIC | Facility: CLINIC | Age: 24
End: 2024-04-12
Payer: COMMERCIAL

## 2024-04-12 VITALS
DIASTOLIC BLOOD PRESSURE: 70 MMHG | WEIGHT: 137.4 LBS | SYSTOLIC BLOOD PRESSURE: 104 MMHG | HEIGHT: 59 IN | BODY MASS INDEX: 27.7 KG/M2

## 2024-04-12 DIAGNOSIS — F33.9 EPISODE OF RECURRENT MAJOR DEPRESSIVE DISORDER, UNSPECIFIED DEPRESSION EPISODE SEVERITY (HCC): ICD-10-CM

## 2024-04-12 DIAGNOSIS — R06.02 SHORTNESS OF BREATH: ICD-10-CM

## 2024-04-12 DIAGNOSIS — F41.9 ANXIETY: Primary | ICD-10-CM

## 2024-04-12 PROCEDURE — 99214 OFFICE O/P EST MOD 30 MIN: CPT | Performed by: INTERNAL MEDICINE

## 2024-04-12 RX ORDER — HYDROXYZINE PAMOATE 25 MG/1
25 CAPSULE ORAL EVERY 6 HOURS PRN
Qty: 30 CAPSULE | Refills: 0 | Status: SHIPPED | OUTPATIENT
Start: 2024-04-12

## 2024-04-12 RX ORDER — BUSPIRONE HYDROCHLORIDE 15 MG/1
15 TABLET ORAL 2 TIMES DAILY
Qty: 60 TABLET | Refills: 2 | Status: SHIPPED | OUTPATIENT
Start: 2024-04-12

## 2024-04-12 RX ORDER — VENLAFAXINE HYDROCHLORIDE 225 MG/1
225 TABLET, EXTENDED RELEASE ORAL EVERY MORNING
Qty: 60 TABLET | Refills: 2 | Status: SHIPPED | OUTPATIENT
Start: 2024-04-12

## 2024-04-12 NOTE — PROGRESS NOTES
Name: Enriqueta Trevino      : 2000      MRN: [id number]  Encounter Provider: Lea Reyes, MD  Encounter Date: 2024   Encounter department: [facility]    Assessment & Plan   SOB likely anxiety driven  Upon close review of her medications, she has not been compliant with buspirone and venlafaxine. She appears to be taking the Seroquel based on the refills. She was seeing psychiatry but her psychiatrist was no longer available so she has requested me to take over her refills. I have only received refill requests for Seroquel. Resume buspirone and venlafaxine  Instructed to check her medications at home    1. Anxiety  -     hydrOXYzine pamoate (VISTARIL) 25 mg capsule; Take 1 capsule (25 mg total) by mouth every 6 (six) hours as needed for anxiety  -     busPIRone (BUSPAR) 15 mg tablet; Take 1 tablet (15 mg total) by mouth 2 (two) times a day  -     venlafaxine 225 MG TB24 24 hr tablet; Take 1 tablet (225 mg total) by mouth every morning    2. Episode of recurrent major depressive disorder, unspecified depression episode severity (HCC)    3. Shortness of breath           Subjective      Here for SOB, anxiety  Continues to have SOB at night  Normal CXR PFTs echo Holter  Requesting hydroxyzine refill      Review of Systems   Respiratory:  Positive for shortness of breath.    Cardiovascular:  Negative for chest pain.   Gastrointestinal:  Negative for abdominal pain.   Psychiatric/Behavioral:  The patient is nervous/anxious.        Current Outpatient Medications on File Prior to Visit   Medication Sig   • Cholecalciferol (Vitamin D3) 50 MCG ( UT) TABS Take 1 tablet (2,000 Units total) by mouth daily   • Lactobacillus Rhamnosus, GG, (Culturelle) CAPS Take 1 capsule by mouth daily   • lactulose (CHRONULAC) 10 g/15 mL solution take 15 milliliters by mouth once daily   • Linzess 290 MCG CAPS Take 1 capsule by mouth every morning   • medroxyPROGESTERone (DEPO-PROVERA) 150 mg/mL injection  "use as directed at physician's office every 3 months   • QUEtiapine (SEROquel) 200 mg tablet Take 1 tablet (200 mg total) by mouth daily at bedtime   • albuterol (PROVENTIL HFA,VENTOLIN HFA) 90 mcg/act inhaler PRN   • methylphenidate (CONCERTA) 18 mg ER tablet Take 18 mg by mouth daily       Objective     /70 (BP Location: Left arm, Patient Position: Sitting, Cuff Size: Standard)   Ht 4' 11\" (1.499 m)   Wt 62.3 kg (137 lb 6.4 oz)   BMI 27.75 kg/m²     Physical Exam  Constitutional:       Appearance: She is well-developed. She is not ill-appearing.   Eyes:      Conjunctiva/sclera: Conjunctivae normal.   Cardiovascular:      Rate and Rhythm: Normal rate and regular rhythm.      Heart sounds: Normal heart sounds. No murmur heard.  Pulmonary:      Effort: Pulmonary effort is normal. No respiratory distress.      Breath sounds: Normal breath sounds. No wheezing or rales.   Abdominal:      General: There is no distension.      Palpations: Abdomen is soft. There is no mass.      Tenderness: There is no abdominal tenderness. There is no guarding or rebound.   Musculoskeletal:      Cervical back: Neck supple.      Right lower leg: No edema.      Left lower leg: No edema.   Neurological:      Mental Status: She is alert and oriented to person, place, and time.   Psychiatric:         Mood and Affect: Mood is anxious.         Behavior: Behavior normal.         Thought Content: Thought content normal.         Judgment: Judgment normal.       Lea Reyes, MD    "

## 2024-09-23 ENCOUNTER — TELEPHONE (OUTPATIENT)
Age: 24
End: 2024-09-23

## 2025-02-11 ENCOUNTER — OFFICE VISIT (OUTPATIENT)
Dept: URGENT CARE | Age: 25
End: 2025-02-11
Payer: COMMERCIAL

## 2025-02-11 VITALS
WEIGHT: 128 LBS | TEMPERATURE: 98.4 F | RESPIRATION RATE: 18 BRPM | DIASTOLIC BLOOD PRESSURE: 72 MMHG | HEART RATE: 90 BPM | OXYGEN SATURATION: 99 % | BODY MASS INDEX: 25.85 KG/M2 | SYSTOLIC BLOOD PRESSURE: 106 MMHG

## 2025-02-11 DIAGNOSIS — R39.9 UTI SYMPTOMS: Primary | ICD-10-CM

## 2025-02-11 LAB
SL AMB  POCT GLUCOSE, UA: NEGATIVE
SL AMB LEUKOCYTE ESTERASE,UA: NORMAL
SL AMB POCT BILIRUBIN,UA: NEGATIVE
SL AMB POCT BLOOD,UA: NEGATIVE
SL AMB POCT CLARITY,UA: NORMAL
SL AMB POCT COLOR,UA: YELLOW
SL AMB POCT KETONES,UA: NEGATIVE
SL AMB POCT NITRITE,UA: NEGATIVE
SL AMB POCT PH,UA: 6.5
SL AMB POCT SPECIFIC GRAVITY,UA: 1.01
SL AMB POCT URINE HCG: NEGATIVE
SL AMB POCT URINE PROTEIN: NEGATIVE
SL AMB POCT UROBILINOGEN: 0.2

## 2025-02-11 PROCEDURE — 81002 URINALYSIS NONAUTO W/O SCOPE: CPT

## 2025-02-11 PROCEDURE — 87086 URINE CULTURE/COLONY COUNT: CPT

## 2025-02-11 PROCEDURE — 87186 SC STD MICRODIL/AGAR DIL: CPT

## 2025-02-11 PROCEDURE — 81025 URINE PREGNANCY TEST: CPT

## 2025-02-11 PROCEDURE — 87077 CULTURE AEROBIC IDENTIFY: CPT

## 2025-02-11 PROCEDURE — 99213 OFFICE O/P EST LOW 20 MIN: CPT

## 2025-02-11 RX ORDER — HYDROXYZINE HYDROCHLORIDE 25 MG/1
25 TABLET, FILM COATED ORAL 2 TIMES DAILY
COMMUNITY
Start: 2025-01-30

## 2025-02-11 RX ORDER — QUETIAPINE FUMARATE 50 MG/1
50 TABLET, FILM COATED ORAL
COMMUNITY
Start: 2025-01-30

## 2025-02-11 RX ORDER — FLUOXETINE 10 MG/1
10 CAPSULE ORAL DAILY
COMMUNITY
Start: 2025-01-30

## 2025-02-11 RX ORDER — BUSPIRONE HYDROCHLORIDE 10 MG/1
2 TABLET ORAL 2 TIMES DAILY
COMMUNITY
Start: 2025-01-30

## 2025-02-11 NOTE — PROGRESS NOTES
"  [facility]        NAME: Enriqueta Trevino is a 24 y.o. female  : 2000    MRN: [id number]  DATE: 2025  TIME: 5:10 PM    Assessment and Plan   UTI symptoms [R39.9]  1. UTI symptoms  POCT urine dip    POCT urine HCG    Urine culture    Urine culture        UA unremarkable.  Pending urine culture.  Will follow-up with PCP/gynecologist    Patient Instructions       Follow up with PCP in 3-5 days.  Proceed to  ER if symptoms worsen.    If tests have been performed at [facility], our office will contact you with results if changes need to be made to the care plan discussed with you at the visit.  You can review your full results on [facility] MyChart.    Chief Complaint     Chief Complaint   Patient presents with    Possible UTI     Ongoing for about two weeks  States is having frequency and is having episodes where is feeling like has to urinate but can't          History of Present Illness       25 y/o F presents for \"UTI symptoms \"for 2 weeks.  Patient was she has a history of recurrent UTIs.  Has increased urinary frequency and urgency.  Denies fevers, chills, abdominal pain, flank pain, nausea, vomiting.  No use of other the counter medicine.        Review of Systems   Review of Systems   Constitutional:  Negative for chills and fever.   Genitourinary:  Positive for frequency and urgency. Negative for dysuria, flank pain, hematuria and pelvic pain.         Current Medications       Current Outpatient Medications:     busPIRone (BUSPAR) 10 mg tablet, Take 2 tablets by mouth 2 (two) times a day, Disp: , Rfl:     FLUoxetine (PROzac) 10 mg capsule, Take 10 mg by mouth daily, Disp: , Rfl:     hydrOXYzine HCL (ATARAX) 25 mg tablet, Take 25 mg by mouth 2 (two) times a day, Disp: , Rfl:     hydrOXYzine pamoate (VISTARIL) 25 mg capsule, Take 1 capsule (25 mg total) by mouth every 6 (six) hours as needed for anxiety, Disp: 30 capsule, Rfl: 0    Linzess 290 MCG CAPS, Take 1 capsule by mouth every " morning, Disp: , Rfl:     medroxyPROGESTERone (DEPO-PROVERA) 150 mg/mL injection, use as directed at physician's office every 3 months, Disp: , Rfl:     methylphenidate (CONCERTA) 18 mg ER tablet, Take 18 mg by mouth daily, Disp: , Rfl:     QUEtiapine (SEROquel) 200 mg tablet, Take 1 tablet (200 mg total) by mouth daily at bedtime, Disp: 30 tablet, Rfl: 3    QUEtiapine (SEROquel) 50 mg tablet, Take 50 mg by mouth, Disp: , Rfl:     venlafaxine 225 MG TB24 24 hr tablet, Take 1 tablet (225 mg total) by mouth every morning, Disp: 60 tablet, Rfl: 2    albuterol (PROVENTIL HFA,VENTOLIN HFA) 90 mcg/act inhaler, PRN (Patient not taking: Reported on 2/11/2025), Disp: , Rfl:     busPIRone (BUSPAR) 15 mg tablet, Take 1 tablet (15 mg total) by mouth 2 (two) times a day (Patient not taking: Reported on 2/11/2025), Disp: 60 tablet, Rfl: 2    Cholecalciferol (Vitamin D3) 50 MCG (2000 UT) TABS, Take 1 tablet (2,000 Units total) by mouth daily (Patient not taking: Reported on 2/11/2025), Disp: 90 tablet, Rfl: 1    Lactobacillus Rhamnosus, GG, (Culturelle) CAPS, Take 1 capsule by mouth daily (Patient not taking: Reported on 2/11/2025), Disp: 90 capsule, Rfl: 3    lactulose (CHRONULAC) 10 g/15 mL solution, take 15 milliliters by mouth once daily (Patient not taking: Reported on 2/11/2025), Disp: 946 mL, Rfl: 2    Current Allergies     Allergies as of 02/11/2025 - Reviewed 02/11/2025   Allergen Reaction Noted    Other Allergic Rhinitis 08/12/2015            The following portions of the patient's history were reviewed and updated as appropriate: allergies, current medications, past family history, past medical history, past social history, past surgical history and problem list.     Past Medical History:   Diagnosis Date    Acute cystitis without hematuria 10/08/2021    Allergic     Anxiety     Cancer (HCC)     Fatigue 04/25/2017    Hodgkin lymphoma (HCC)     Non-intractable vomiting 03/18/2021       Past Surgical History:   Procedure  Laterality Date    BONE MARROW BIOPSY      FL VCUG VOIDING URETHROCYSTOGRAM  1/2/2019       Family History   Problem Relation Age of Onset    No Known Problems Mother     Depression Father     Bipolar disorder Father     Diabetes Maternal Grandmother     Lymphoma Maternal Grandmother     Hypertension Maternal Grandfather     Colon cancer Paternal Grandmother          Medications have been verified.        Objective   /72   Pulse 90   Temp 98.4 °F (36.9 °C)   Resp 18   Wt 58.1 kg (128 lb)   SpO2 99%   BMI 25.85 kg/m²   No LMP recorded.       Physical Exam     Physical Exam  Vitals and nursing note reviewed.   Constitutional:       General: She is not in acute distress.     Appearance: She is not toxic-appearing.   HENT:      Head: Normocephalic and atraumatic.   Eyes:      Conjunctiva/sclera: Conjunctivae normal.   Cardiovascular:      Rate and Rhythm: Normal rate and regular rhythm.   Pulmonary:      Effort: Pulmonary effort is normal.      Breath sounds: Normal breath sounds.   Abdominal:      General: There is no distension.      Palpations: Abdomen is soft.      Tenderness: There is no abdominal tenderness. There is no right CVA tenderness, left CVA tenderness or guarding.   Neurological:      Mental Status: She is alert.   Psychiatric:         Mood and Affect: Mood normal.         Behavior: Behavior normal.

## 2025-02-13 LAB — BACTERIA UR CULT: ABNORMAL

## 2025-02-14 ENCOUNTER — RESULTS FOLLOW-UP (OUTPATIENT)
Dept: URGENT CARE | Age: 25
End: 2025-02-14

## 2025-06-09 ENCOUNTER — OFFICE VISIT (OUTPATIENT)
Dept: URGENT CARE | Age: 25
End: 2025-06-09
Payer: COMMERCIAL

## 2025-06-09 VITALS
SYSTOLIC BLOOD PRESSURE: 112 MMHG | TEMPERATURE: 98.4 F | WEIGHT: 128 LBS | DIASTOLIC BLOOD PRESSURE: 69 MMHG | BODY MASS INDEX: 25.85 KG/M2 | HEART RATE: 100 BPM | OXYGEN SATURATION: 100 % | RESPIRATION RATE: 16 BRPM

## 2025-06-09 DIAGNOSIS — H61.23 IMPACTED CERUMEN OF BOTH EARS: Primary | ICD-10-CM

## 2025-06-09 PROCEDURE — 99213 OFFICE O/P EST LOW 20 MIN: CPT | Performed by: STUDENT IN AN ORGANIZED HEALTH CARE EDUCATION/TRAINING PROGRAM

## 2025-06-09 PROCEDURE — 69209 REMOVE IMPACTED EAR WAX UNI: CPT | Performed by: STUDENT IN AN ORGANIZED HEALTH CARE EDUCATION/TRAINING PROGRAM

## 2025-06-09 NOTE — PROGRESS NOTES
[facility]        NAME: Enriqueta Trevino is a 25 y.o. female  : 2000    MRN: [id number]  DATE: 2025  TIME: 5:30 PM    Assessment and Plan   Impacted cerumen of both ears [H61.23]  1. Impacted cerumen of both ears              Patient Instructions       Follow up with PCP in 3-5 days.  Proceed to  ER if symptoms worsen.    If tests have been performed at [facility], our office will contact you with results if changes need to be made to the care plan discussed with you at the visit.  [facility]    Chief Complaint     Chief Complaint   Patient presents with   • Ear Fullness     Pt started two days ago with left ear fullness and pain after taking shower.  No pain relievers taken.       Ear cerumen removal    Date/Time: 2025 1:00 PM    Performed by: Basilio Oliveira DO  Authorized by: Basilio Oliveira DO    Universal Protocol:  procedure performed by consultantConsent: Verbal consent obtained  Consent given by: patient  Patient understanding: patient states understanding of the procedure being performed  Patient consent: the patient's understanding of the procedure matches consent given  Required items: required blood products, implants, devices, and special equipment available    Patient location:  Clinic  Indications / Diagnosis:  Cerumen impaction  Procedure details:     Local anesthetic:  None    Location:  Both ears    Approach:  External  Post-procedure details:     Complication:  None    Hearing quality:  Improved    Patient tolerance of procedure:  Tolerated well, no immediate complications  Comments:      Cerumen cleared, Tms visualized bilaterally, mild erythema       History of Present Illness       Ear Fullness   There is pain in both ears. The problem has been unchanged. There has been no fever. Pertinent negatives include no abdominal pain, coughing, diarrhea, ear discharge, headaches, hearing loss or neck pain. She has tried  nothing for the symptoms. The treatment provided no relief.       Review of Systems   Review of Systems   HENT:  Negative for ear discharge and hearing loss.    Respiratory:  Negative for cough.    Gastrointestinal:  Negative for abdominal pain and diarrhea.   Musculoskeletal:  Negative for neck pain.   Neurological:  Negative for headaches.       As stated in HPI      Current Medications     Current Medications[1]    Current Allergies     Allergies as of 06/09/2025 - Reviewed 06/09/2025   Allergen Reaction Noted   • Other Allergic Rhinitis 08/12/2015            The following portions of the patient's history were reviewed and updated as appropriate: allergies, current medications, past family history, past medical history, past social history, past surgical history and problem list.     Past Medical History[2]    Past Surgical History[3]    Family History[4]      Medications have been verified.        Objective   /69   Pulse 100   Temp 98.4 °F (36.9 °C)   Resp 16   Wt 58.1 kg (128 lb)   SpO2 100%   BMI 25.85 kg/m²   No LMP recorded. Patient has had an injection.       Physical Exam     Physical Exam  Constitutional:       General: She is not in acute distress.     Appearance: She is well-developed.   HENT:      Head: Normocephalic and atraumatic.      Right Ear: External ear normal. There is impacted cerumen.      Left Ear: External ear normal. There is impacted cerumen.     Cardiovascular:      Rate and Rhythm: Normal rate.   Pulmonary:      Effort: Pulmonary effort is normal.     Neurological:      General: No focal deficit present.      Mental Status: She is alert and oriented to person, place, and time.     Psychiatric:         Speech: Speech normal.         Behavior: Behavior normal.                      [1]    Current Outpatient Medications:   •  busPIRone (BUSPAR) 10 mg tablet, Take 2 tablets by mouth in the morning and 2 tablets before bedtime., Disp: , Rfl:   •  FLUoxetine (PROzac) 10 mg  capsule, Take 10 mg by mouth in the morning., Disp: , Rfl:   •  hydrOXYzine HCL (ATARAX) 25 mg tablet, Take 25 mg by mouth in the morning and 25 mg in the evening., Disp: , Rfl:   •  hydrOXYzine pamoate (VISTARIL) 25 mg capsule, Take 1 capsule (25 mg total) by mouth every 6 (six) hours as needed for anxiety, Disp: 30 capsule, Rfl: 0  •  Linzess 290 MCG CAPS, Take 1 capsule by mouth every morning, Disp: , Rfl:   •  medroxyPROGESTERone (DEPO-PROVERA) 150 mg/mL injection, , Disp: , Rfl:   •  methylphenidate (CONCERTA) 18 mg ER tablet, Take 18 mg by mouth in the morning., Disp: , Rfl:   •  QUEtiapine (SEROquel) 200 mg tablet, Take 1 tablet (200 mg total) by mouth daily at bedtime, Disp: 30 tablet, Rfl: 3  •  QUEtiapine (SEROquel) 50 mg tablet, Take 50 mg by mouth, Disp: , Rfl:   •  venlafaxine 225 MG TB24 24 hr tablet, Take 1 tablet (225 mg total) by mouth every morning, Disp: 60 tablet, Rfl: 2  •  albuterol (PROVENTIL HFA,VENTOLIN HFA) 90 mcg/act inhaler, PRN (Patient not taking: Reported on 6/9/2025), Disp: , Rfl:   •  busPIRone (BUSPAR) 15 mg tablet, Take 1 tablet (15 mg total) by mouth 2 (two) times a day (Patient not taking: Reported on 6/9/2025), Disp: 60 tablet, Rfl: 2  •  Cholecalciferol (Vitamin D3) 50 MCG (2000 UT) TABS, Take 1 tablet (2,000 Units total) by mouth daily (Patient not taking: Reported on 6/9/2025), Disp: 90 tablet, Rfl: 1  •  Lactobacillus Rhamnosus, GG, (Culturelle) CAPS, Take 1 capsule by mouth daily (Patient not taking: Reported on 6/9/2025), Disp: 90 capsule, Rfl: 3  •  lactulose (CHRONULAC) 10 g/15 mL solution, take 15 milliliters by mouth once daily (Patient not taking: Reported on 6/9/2025), Disp: 946 mL, Rfl: 2  [2]  Past Medical History:  Diagnosis Date   • Acute cystitis without hematuria 10/08/2021   • Allergic    • Anxiety    • Cancer (HCC)    • Fatigue 04/25/2017   • Hodgkin lymphoma (HCC)    • Non-intractable vomiting 03/18/2021   [3]  Past Surgical History:  Procedure Laterality  Date   • BONE MARROW BIOPSY     • FL VCUG VOIDING URETHROCYSTOGRAM  1/2/2019   [4]  Family History  Problem Relation Name Age of Onset   • No Known Problems Mother     • Depression Father Derek    • Bipolar disorder Father Derek    • Diabetes Maternal Grandmother Suad    • Lymphoma Maternal Grandmother Suad    • Hypertension Maternal Grandfather Papa    • Colon cancer Paternal Grandmother Sabrina

## 2025-07-11 ENCOUNTER — DOCUMENTATION (OUTPATIENT)
Dept: INTERNAL MEDICINE CLINIC | Facility: CLINIC | Age: 25
End: 2025-07-11

## 2025-07-11 ENCOUNTER — TELEPHONE (OUTPATIENT)
Dept: ADMINISTRATIVE | Facility: OTHER | Age: 25
End: 2025-07-11

## 2025-07-11 NOTE — TELEPHONE ENCOUNTER
Upon review of the In Basket request we were able to locate, review, and update the patient chart as requested for Pap Smear (HPV) aka Cervical Cancer Screening.    Any additional questions or concerns should be emailed to the Practice Liaisons via the appropriate education email address, please do not reply via In Basket.    Thank you  Gillian Hahn MA   [facility]

## 2025-07-11 NOTE — TELEPHONE ENCOUNTER
----- Message from Paige VAZ sent at 7/11/2025 10:42 AM EDT -----  Regarding: Care Gap Request  07/11/25 10:42 AM    Hello, our patient above has had Pap Smear (HPV) aka Cervical Cancer Screening completed/performed. Please assist in updating the patient chart by pulling the document from Lab Tab within Chart Review. The date of service is 03/31/2023.     Thank you,  Paige Birch MA  [facility] [place]